# Patient Record
Sex: MALE | Race: WHITE | Employment: OTHER | URBAN - METROPOLITAN AREA
[De-identification: names, ages, dates, MRNs, and addresses within clinical notes are randomized per-mention and may not be internally consistent; named-entity substitution may affect disease eponyms.]

---

## 2017-08-20 ENCOUNTER — HOSPITAL ENCOUNTER (EMERGENCY)
Age: 47
Discharge: HOME | End: 2017-08-20
Attending: EMERGENCY MEDICINE
Payer: COMMERCIAL

## 2017-08-20 ENCOUNTER — APPOINTMENT (OUTPATIENT)
Dept: CT IMAGING | Age: 47
End: 2017-08-20
Attending: EMERGENCY MEDICINE
Payer: COMMERCIAL

## 2017-08-20 VITALS
HEART RATE: 84 BPM | SYSTOLIC BLOOD PRESSURE: 127 MMHG | DIASTOLIC BLOOD PRESSURE: 102 MMHG | OXYGEN SATURATION: 94 % | RESPIRATION RATE: 16 BRPM | TEMPERATURE: 98.2 F

## 2017-08-20 DIAGNOSIS — R56.9 SEIZURE (HCC): Primary | ICD-10-CM

## 2017-08-20 LAB
ALBUMIN SERPL-MCNC: 3.5 G/DL (ref 3.5–5)
ALBUMIN/GLOB SERPL: 1.1 {RATIO} (ref 1.1–2.2)
ALP SERPL-CCNC: 84 U/L (ref 45–117)
ALT SERPL-CCNC: 29 U/L (ref 12–78)
AMPHET UR QL SCN: NEGATIVE
ANION GAP SERPL CALC-SCNC: 9 MMOL/L (ref 5–15)
AST SERPL-CCNC: 27 U/L (ref 15–37)
BARBITURATES UR QL SCN: NEGATIVE
BASOPHILS # BLD: 0 K/UL (ref 0–0.1)
BASOPHILS NFR BLD: 0 % (ref 0–1)
BENZODIAZ UR QL: NEGATIVE
BILIRUB SERPL-MCNC: 0.2 MG/DL (ref 0.2–1)
BUN SERPL-MCNC: 10 MG/DL (ref 6–20)
BUN/CREAT SERPL: 12 (ref 12–20)
CALCIUM SERPL-MCNC: 8 MG/DL (ref 8.5–10.1)
CANNABINOIDS UR QL SCN: NEGATIVE
CHLORIDE SERPL-SCNC: 106 MMOL/L (ref 97–108)
CO2 SERPL-SCNC: 24 MMOL/L (ref 21–32)
COCAINE UR QL SCN: NEGATIVE
CREAT SERPL-MCNC: 0.84 MG/DL (ref 0.7–1.3)
DRUG SCRN COMMENT,DRGCM: NORMAL
EOSINOPHIL # BLD: 0.2 K/UL (ref 0–0.4)
EOSINOPHIL NFR BLD: 2 % (ref 0–7)
ERYTHROCYTE [DISTWIDTH] IN BLOOD BY AUTOMATED COUNT: 13.3 % (ref 11.5–14.5)
GLOBULIN SER CALC-MCNC: 3.3 G/DL (ref 2–4)
GLUCOSE SERPL-MCNC: 88 MG/DL (ref 65–100)
HCT VFR BLD AUTO: 41.4 % (ref 36.6–50.3)
HGB BLD-MCNC: 14.2 G/DL (ref 12.1–17)
LYMPHOCYTES # BLD: 4.2 K/UL (ref 0.8–3.5)
LYMPHOCYTES NFR BLD: 44 % (ref 12–49)
MCH RBC QN AUTO: 32.2 PG (ref 26–34)
MCHC RBC AUTO-ENTMCNC: 34.3 G/DL (ref 30–36.5)
MCV RBC AUTO: 93.9 FL (ref 80–99)
METHADONE UR QL: NEGATIVE
MONOCYTES # BLD: 0.9 K/UL (ref 0–1)
MONOCYTES NFR BLD: 10 % (ref 5–13)
NEUTS SEG # BLD: 4.1 K/UL (ref 1.8–8)
NEUTS SEG NFR BLD: 44 % (ref 32–75)
OPIATES UR QL: NEGATIVE
PCP UR QL: NEGATIVE
PLATELET # BLD AUTO: 331 K/UL (ref 150–400)
POTASSIUM SERPL-SCNC: 3.4 MMOL/L (ref 3.5–5.1)
PROT SERPL-MCNC: 6.8 G/DL (ref 6.4–8.2)
RBC # BLD AUTO: 4.41 M/UL (ref 4.1–5.7)
SODIUM SERPL-SCNC: 139 MMOL/L (ref 136–145)
WBC # BLD AUTO: 9.4 K/UL (ref 4.1–11.1)

## 2017-08-20 PROCEDURE — 36415 COLL VENOUS BLD VENIPUNCTURE: CPT | Performed by: EMERGENCY MEDICINE

## 2017-08-20 PROCEDURE — 96375 TX/PRO/DX INJ NEW DRUG ADDON: CPT

## 2017-08-20 PROCEDURE — 96374 THER/PROPH/DIAG INJ IV PUSH: CPT

## 2017-08-20 PROCEDURE — 80307 DRUG TEST PRSMV CHEM ANLYZR: CPT | Performed by: EMERGENCY MEDICINE

## 2017-08-20 PROCEDURE — 85025 COMPLETE CBC W/AUTO DIFF WBC: CPT | Performed by: EMERGENCY MEDICINE

## 2017-08-20 PROCEDURE — 70450 CT HEAD/BRAIN W/O DYE: CPT

## 2017-08-20 PROCEDURE — 74011250636 HC RX REV CODE- 250/636: Performed by: EMERGENCY MEDICINE

## 2017-08-20 PROCEDURE — 99285 EMERGENCY DEPT VISIT HI MDM: CPT

## 2017-08-20 PROCEDURE — 80053 COMPREHEN METABOLIC PANEL: CPT | Performed by: EMERGENCY MEDICINE

## 2017-08-20 PROCEDURE — 74011000258 HC RX REV CODE- 258: Performed by: EMERGENCY MEDICINE

## 2017-08-20 PROCEDURE — 96361 HYDRATE IV INFUSION ADD-ON: CPT

## 2017-08-20 PROCEDURE — 72125 CT NECK SPINE W/O DYE: CPT

## 2017-08-20 RX ORDER — LORAZEPAM 2 MG/ML
1 INJECTION INTRAMUSCULAR
Status: COMPLETED | OUTPATIENT
Start: 2017-08-20 | End: 2017-08-20

## 2017-08-20 RX ORDER — LEVETIRACETAM 750 MG/1
750 TABLET ORAL 2 TIMES DAILY
Qty: 30 TAB | Refills: 0 | Status: SHIPPED | OUTPATIENT
Start: 2017-08-20

## 2017-08-20 RX ADMIN — LORAZEPAM 1 MG: 2 INJECTION INTRAMUSCULAR; INTRAVENOUS at 03:10

## 2017-08-20 RX ADMIN — LEVETIRACETAM 1500 MG: 100 INJECTION, SOLUTION, CONCENTRATE INTRAVENOUS at 05:12

## 2017-08-20 RX ADMIN — SODIUM CHLORIDE 1000 ML: 9 INJECTION, SOLUTION INTRAVENOUS at 03:10

## 2017-08-20 NOTE — ED NOTES
Pt received to room via EMS. A&O x4. Wife at the bedside with pt. Reporting that pt had had a seizure. Is believed to be due to an increased intake of alcohol this evening. Reporting 9/10 sharp headache and 6/10 back pain. Dr. Mayito Odonnell at the bedside to evaluate pt. Seizure precautions initiated.
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
## 2017-08-20 NOTE — ED NOTES
Pt sleeping in stretcher. Call bell within reach. Updated pt's wife on plan of care. Awaiting imaging results. No other complaints voiced at this time.

## 2017-08-20 NOTE — ED NOTES
Pt sitting up at the end of his bed. Has pulled off his pulse ox and was attempting pull out IV. This nurse to the bedside to, pt reporting needs to use restroom. Upon standing up, pt was unsteady. Insisted to continue to walk, despite swaying back and forth. \"I just got up too fast.\"     This nurse to assist pt with unsteady gait to restroom. Second nurse to assist primary nurse after pt ambulated first 10-15 feet. Pt refused to use restroom while in presence of nurse. Advised pt to pull on red chord in bathroom prior to getting up. Pt then stood up and opened door, alarm to being finished was not pulled. This nurse to assist pt back, again with unsteady gait. \"I'm fine, I can walk by myself. \" This nurse had to redirect due to stumbling. Dr. Tony Roper advised of ambulating experience.

## 2017-08-20 NOTE — ED NOTES
Bedside shift change report given to Krystle Omer RN (oncoming nurse) by Nelida Acuña RN (offgoing nurse). Report included the following information ED Summary.

## 2017-08-20 NOTE — ED NOTES
Pt sleeping in stretcher. Call bell within reach. Medicated with seizure medication. Family updated on plan of care. No other complaints voiced at this time.

## 2017-08-20 NOTE — ED NOTES
Pt. Noah Barker with gait belt. Pt. Was slow. Pt. Lpuis Welch in wheelchair and taken to vehicle by RN.

## 2019-09-04 ENCOUNTER — HOSPITAL ENCOUNTER (EMERGENCY)
Facility: HOSPITAL | Age: 49
Discharge: HOME | End: 2019-09-04
Admitting: EMERGENCY MEDICINE

## 2019-09-04 ENCOUNTER — APPOINTMENT (OUTPATIENT)
Dept: GENERAL RADIOLOGY | Facility: HOSPITAL | Age: 49
End: 2019-09-04

## 2019-09-04 VITALS
WEIGHT: 190 LBS | HEIGHT: 75 IN | OXYGEN SATURATION: 100 % | HEART RATE: 84 BPM | TEMPERATURE: 98.4 F | SYSTOLIC BLOOD PRESSURE: 149 MMHG | RESPIRATION RATE: 16 BRPM | BODY MASS INDEX: 23.62 KG/M2 | DIASTOLIC BLOOD PRESSURE: 72 MMHG

## 2019-09-04 DIAGNOSIS — S61.217A LACERATION OF LEFT LITTLE FINGER WITHOUT FOREIGN BODY WITHOUT DAMAGE TO NAIL, INITIAL ENCOUNTER: Primary | ICD-10-CM

## 2019-09-04 PROCEDURE — 73130 X-RAY EXAM OF HAND: CPT

## 2019-09-04 PROCEDURE — 99282 EMERGENCY DEPT VISIT SF MDM: CPT

## 2019-09-05 NOTE — ED PROVIDER NOTES
Subjective   Patient is a 49-year-old white male with no significant medical history who presents today with complaints of a laceration to his left fifth finger.  States he was cutting something at work today when the knife slipped and cut his left hand.  Denies any numbness tingling or weakness distal to the injury.  He denies any other injury or complaint.  States his last tetanus booster was 3 years ago.            Review of Systems   Skin: Positive for wound.        Left hand laceration   Neurological: Negative for weakness and numbness.       No past medical history on file.    Allergies   Allergen Reactions   • Morphine Unknown (See Comments)     unknown       No past surgical history on file.    No family history on file.    Social History     Socioeconomic History   • Marital status:      Spouse name: Not on file   • Number of children: Not on file   • Years of education: Not on file   • Highest education level: Not on file           Objective   Physical Exam   Constitutional: He appears well-developed.   Vital signs and triage nurse note reviewed.  Constitutional: Awake, alert; well-developed and well-nourished. No acute distress is noted.  Cardiovascular: Regular rate and rhythm  Pulmonary: Respiratory effort regular nonlabored  Musculoskeletal: Independent range of motion of all extremities with no palpable tenderness or edema.  Neuro: Alert oriented x3, speech is clear and appropriate, GCS 15.    Skin: Flesh tone, warm, dry; no erythematous or petechial rash or lesion.  There is a 2.5 cm laceration noted over the palmar aspect of the proximal left fifth finger.  Small amount of active bleeding controlled with direct pressure.  No visible tendon laceration.  Good strength with flexion and extension against resistance.  Good cap refill and sensation distally.        Laceration Repair  Date/Time: 9/5/2019 1:32 AM  Performed by: Ayesha Corbett APRN  Authorized by: Ayesha Corbett APRN     Consent:      Consent obtained:  Verbal    Consent given by:  Patient    Risks discussed:  Infection, pain, tendon damage, vascular damage, nerve damage and need for additional repair  Anesthesia (see MAR for exact dosages):     Anesthesia method:  Local infiltration    Local anesthetic:  Lidocaine 2% w/o epi  Laceration details:     Location:  Finger    Finger location:  L small finger    Length (cm):  2.5  Repair type:     Repair type:  Simple  Pre-procedure details:     Preparation:  Patient was prepped and draped in usual sterile fashion  Exploration:     Wound exploration: wound explored through full range of motion and entire depth of wound probed and visualized      Wound extent: no tendon damage noted    Treatment:     Area cleansed with:  Hibiclens and saline    Amount of cleaning:  Standard  Skin repair:     Repair method:  Sutures    Suture size:  4-0    Suture material:  Nylon    Suture technique:  Simple interrupted    Number of sutures:  8  Approximation:     Approximation:  Close    Vermilion border: well-aligned    Post-procedure details:     Dressing:  Antibiotic ointment and non-adherent dressing    Patient tolerance of procedure:  Tolerated well, no immediate complications               ED Course  Final Diagnosis: as of Sep 05 0134   Laceration of left little finger without foreign body without damage to nail, initial encounter      Labs Reviewed - No data to display  Xr Hand 3+ View Right    Result Date: 9/4/2019  No acute fracture or dislocation.  Electronically Signed By-Pierre Mora On:9/4/2019 7:32 PM This report was finalized on [id number] by  Pierre Mora, .    Medications - No data to display              MDM  Number of Diagnoses or Management Options  Laceration of left little finger without foreign body without damage to nail, initial encounter:      Amount and/or Complexity of Data Reviewed  Tests in the radiology section of CPT®: reviewed and ordered    Risk of Complications, Morbidity, and/or  Mortality  General comments: Comorbidities: None  Differentials: Fracture, skin laceration, tendon laceration;this list is not all inclusive and does not constitute the entirety of considered causes    Patient had x-rays obtained.  He had laceration repaired as noted above.  He states he is up-to-date on tetanus booster.    Diagnosis and treatment plan discussed with patient.  Patient agreeable to plan.   I discussed findings with patient who voices understanding of discharge instructions, signs and symptoms requiring return to ED; discharged improved and in stable condition with follow up for re-evaluation.        Patient Progress  Patient progress: stable      Final diagnoses:   Laceration of left little finger without foreign body without damage to nail, initial encounter          Ayesha Corbett, SHANNAN  09/05/19 0134

## 2019-09-05 NOTE — DISCHARGE INSTRUCTIONS
Keep the wound clean with soap and water daily. Apply antibiotic ointment daily. Sutures/staples to be removed in 10-12 days, schedule an appointment with your PCP to have this done, if you do not have a PCP any urgent care or immediate care type places can remove sutures/staples.  Watch for signs of infection.  Return for new or worsening symptoms.

## 2020-01-10 ENCOUNTER — APPOINTMENT (OUTPATIENT)
Dept: GENERAL RADIOLOGY | Facility: HOSPITAL | Age: 50
End: 2020-01-10

## 2020-01-10 ENCOUNTER — HOSPITAL ENCOUNTER (EMERGENCY)
Facility: HOSPITAL | Age: 50
Discharge: HOME | End: 2020-01-10
Admitting: EMERGENCY MEDICINE

## 2020-01-10 VITALS
SYSTOLIC BLOOD PRESSURE: 136 MMHG | TEMPERATURE: 97.6 F | DIASTOLIC BLOOD PRESSURE: 83 MMHG | RESPIRATION RATE: 16 BRPM | OXYGEN SATURATION: 97 % | HEIGHT: 75 IN | WEIGHT: 190.04 LBS | BODY MASS INDEX: 23.63 KG/M2 | HEART RATE: 77 BPM

## 2020-01-10 DIAGNOSIS — M25.552 LEFT HIP PAIN: ICD-10-CM

## 2020-01-10 DIAGNOSIS — M54.42 BILATERAL LOW BACK PAIN WITH LEFT-SIDED SCIATICA, UNSPECIFIED CHRONICITY: Primary | ICD-10-CM

## 2020-01-10 PROCEDURE — 25010000002 DEXAMETHASONE SODIUM PHOSPHATE 10 MG/ML SOLUTION: Performed by: PHYSICIAN ASSISTANT

## 2020-01-10 PROCEDURE — 99283 EMERGENCY DEPT VISIT LOW MDM: CPT

## 2020-01-10 PROCEDURE — 96374 THER/PROPH/DIAG INJ IV PUSH: CPT

## 2020-01-10 PROCEDURE — 96375 TX/PRO/DX INJ NEW DRUG ADDON: CPT

## 2020-01-10 PROCEDURE — 73502 X-RAY EXAM HIP UNI 2-3 VIEWS: CPT

## 2020-01-10 PROCEDURE — 72110 X-RAY EXAM L-2 SPINE 4/>VWS: CPT

## 2020-01-10 PROCEDURE — 25010000002 KETOROLAC TROMETHAMINE PER 15 MG: Performed by: PHYSICIAN ASSISTANT

## 2020-01-10 RX ORDER — LIDOCAINE 50 MG/G
1 PATCH TOPICAL EVERY 24 HOURS
Qty: 6 PATCH | Refills: 0 | Status: SHIPPED | OUTPATIENT
Start: 2020-01-10

## 2020-01-10 RX ORDER — DEXAMETHASONE SODIUM PHOSPHATE 10 MG/ML
10 INJECTION, SOLUTION INTRAMUSCULAR; INTRAVENOUS ONCE
Status: COMPLETED | OUTPATIENT
Start: 2020-01-10 | End: 2020-01-10

## 2020-01-10 RX ORDER — METHOCARBAMOL 750 MG/1
750 TABLET, FILM COATED ORAL 3 TIMES DAILY
Qty: 12 TABLET | Refills: 0 | Status: SHIPPED | OUTPATIENT
Start: 2020-01-10 | End: 2020-03-18

## 2020-01-10 RX ORDER — KETOROLAC TROMETHAMINE 15 MG/ML
15 INJECTION, SOLUTION INTRAMUSCULAR; INTRAVENOUS ONCE
Status: COMPLETED | OUTPATIENT
Start: 2020-01-10 | End: 2020-01-10

## 2020-01-10 RX ORDER — METHYLPREDNISOLONE 4 MG/1
TABLET ORAL
Qty: 21 TABLET | Refills: 0 | Status: SHIPPED | OUTPATIENT
Start: 2020-01-10

## 2020-01-10 RX ORDER — METHOCARBAMOL 750 MG/1
750 TABLET, FILM COATED ORAL ONCE
Status: COMPLETED | OUTPATIENT
Start: 2020-01-10 | End: 2020-01-10

## 2020-01-10 RX ORDER — SODIUM CHLORIDE 0.9 % (FLUSH) 0.9 %
10 SYRINGE (ML) INJECTION AS NEEDED
Status: DISCONTINUED | OUTPATIENT
Start: 2020-01-10 | End: 2020-01-10 | Stop reason: HOSPADM

## 2020-01-10 RX ORDER — LIDOCAINE 50 MG/G
1 PATCH TOPICAL ONCE
Status: DISCONTINUED | OUTPATIENT
Start: 2020-01-10 | End: 2020-01-10 | Stop reason: HOSPADM

## 2020-01-10 RX ADMIN — METHOCARBAMOL 750 MG: 750 TABLET ORAL at 11:27

## 2020-01-10 RX ADMIN — KETOROLAC TROMETHAMINE 15 MG: 15 INJECTION, SOLUTION INTRAMUSCULAR; INTRAVENOUS at 11:27

## 2020-01-10 RX ADMIN — DEXAMETHASONE SODIUM PHOSPHATE 10 MG: 10 INJECTION, SOLUTION INTRAMUSCULAR; INTRAVENOUS at 11:27

## 2020-01-10 RX ADMIN — LIDOCAINE 1 PATCH: 50 PATCH CUTANEOUS at 11:27

## 2020-01-10 NOTE — DISCHARGE INSTRUCTIONS
Take Medrol Dosepak to completion.  Take Robaxin as needed for muscle spasms and pain.  Do not drive while taking this medication.  Use Lidoderm patch on lower back as needed for pain.  May apply heating pad to lower back in 20-minute increments every 2-3 hours while awake.  Use ibuprofen or Tylenol as needed for back pain.  Do not mix ibuprofen Advil, Aleve, Motrin, diclofenac or naproxen.  Try to avoid narcotic pain medication.    Follow-up with primary care, call Dr. Casey's office to establish care.  May call Dr. Lezama's office for further management evaluation of lumbar spine and chronic degenerative spine.    Return to the ER for new or worsening symptoms, increased lower back pain, spasms, radiating pain down lower extremities, numbness and tingling, saddle anesthesia, bladder or bowel dysfunction, chest pain, shortness of breath headache or fever.

## 2020-01-10 NOTE — ED PROVIDER NOTES
"Subjective   Patient is a 49-year-old  male with history of chronic lower back pain and sciatica pain presents ER complaining of lower back pain and left hip pain for 2 to 3 days.  Patient reports a long history of back pain due to \"slipped disc, sciatica pain, herniated disc, and arthritis\", states that he has seen multiple doctors in [place] where he used to live and had steroid injections for his pain.  Patient states that he moved back to [place] sometime ago but has not followed up with any specialist or spinal surgeons.  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  Patient denies any recent fall, trauma or injury.  Patient reports about 3 days ago he started having lower back pain that radiated to his left hip and down his left leg.  Patient describes the pain as a constant dull achy pain with occasional sharp pain.  Patient also complains of some numbness and burning sensations in his left leg.  Patient denies any weakness.  Denies his leg giving out on him, but states that he has had to walk with a cane to keep weight off his left leg and to help with pain.  Patient reports at rest his pain is 6/10, states that with range of motion his pain is a 10/10.  Patient states that he has tried ibuprofen, heat, ice, elevation of his leg at home with no relief.  Patient denies any saddle anesthesia, denies pain in his right leg, denies any bladder or bowel dysfunction.      History provided by:  Patient      Review of Systems   Constitutional: Negative for fever.   HENT: Negative for sore throat and trouble swallowing.    Respiratory: Negative for shortness of breath and wheezing.    Cardiovascular: Negative for chest pain.   Gastrointestinal: Negative for abdominal pain.   Genitourinary: Negative for dysuria.   Musculoskeletal: Positive for arthralgias and back pain. Negative for joint swelling.        Lower back pain, left hip pain   Skin: Negative for " rash.   Neurological: Positive for numbness. Negative for headaches.   Psychiatric/Behavioral: Negative for behavioral problems.   All other systems reviewed and are negative.      No past medical history on file.    Allergies   Allergen Reactions   • Morphine Unknown (See Comments)     unknown       No past surgical history on file.    No family history on file.    Social History     Socioeconomic History   • Marital status:      Spouse name: Not on file   • Number of children: Not on file   • Years of education: Not on file   • Highest education level: Not on file           Objective   Physical Exam   Constitutional: He is oriented to person, place, and time. He appears well-developed and well-nourished. No distress.   HENT:   Head: Normocephalic and atraumatic.   Eyes: Pupils are equal, round, and reactive to light. EOM are normal.   Cardiovascular: Normal rate, regular rhythm, normal heart sounds and intact distal pulses.   No murmur heard.  Pulmonary/Chest: Effort normal and breath sounds normal. He has no wheezes.   Musculoskeletal: He exhibits tenderness. He exhibits no deformity.   Lumbar spine: No step-offs or deformities, no midline tenderness to palpation.  Tenderness to palpation of lateral left hip and lateral left leg  Bilateral lower extremities: Positive left straight leg raise.  5 out of 5 strength.  Sensation intact to light touch.     Neurological: He is alert and oriented to person, place, and time. No sensory deficit. He exhibits normal muscle tone.   Skin: Skin is warm. Capillary refill takes less than 2 seconds. No rash noted.   No overlying erythema, edema or warmth.  No overlying abrasions, lacerations or rash, no signs of cellulitis or infection   Psychiatric: He has a normal mood and affect. His behavior is normal. Judgment and thought content normal.   Nursing note and vitals reviewed.      Procedures           ED Course    /83 (BP Location: Left arm, Patient Position:  "Sitting)   Pulse 77   Temp 97.6 °F (36.4 °C) (Oral)   Resp 16   Ht 190.5 cm (75\")   Wt 86.2 kg (190 lb 0.6 oz)   SpO2 97%   BMI 23.75 kg/m²   Labs Reviewed - No data to display  Medications   sodium chloride 0.9 % flush 10 mL (has no administration in time range)   lidocaine (LIDODERM) 5 % 1 patch (1 patch Transdermal Medication Applied 1/10/20 1127)   ketorolac (TORADOL) injection 15 mg (15 mg Intravenous Given 1/10/20 1127)   methocarbamol (ROBAXIN) tablet 750 mg (750 mg Oral Given 1/10/20 1127)   dexamethasone sodium phosphate injection 10 mg (10 mg Intravenous Given 1/10/20 1127)     Xr Spine Lumbar Complete 4+vw    Result Date: 1/10/2020  No evidence of acute fracture or subluxation. Mild, grade 1 retrolisthesis of L2 on L3 and L5 on S1.  Electronically Signed By-Evans Fournier On:1/10/2020 12:12 PM This report was finalized on [id number] by  Evans Fournier, .    Xr Hip With Or Without Pelvis 2 - 3 View Left    Result Date: 1/10/2020  1. No acute osseous abnormality. 2. Mild osseous prominence of the femoral head neck junction which can be seen with femoral acetabular impingement. No significant joint space narrowing or osteophyte formation.  Electronically Signed By-Jayme Mancia On:1/10/2020 12:11 PM This report was finalized on [id number] by  Jayme Mancia, .                                               MDM  Number of Diagnoses or Management Options  Bilateral low back pain with left-sided sciatica, unspecified chronicity:   Left hip pain:   Diagnosis management comments: MEDICAL DECISION  Epic Chart Review:  Comorbidities: arthritis, DDD  Differentials:  Fracture, contusion, sprain, strain, sciatica, nerve impingement, disc herniation ; this list is not all inclusive and does not constitute the entirety of considered causes  Radiology interpretation:  Images reviewed by me and interpreted by radiologist,   XR Hip  Final result by Jayme Mancia MD (01/10/20 " 12:11:30)             Impression:     1. No acute osseous abnormality.  2. Mild osseous prominence of the femoral head neck junction which can  be seen with femoral acetabular impingement. No significant joint space  narrowing or osteophyte formation.     Electronically Signed By-Jayme Mancia On:1/10/2020 12:11 PM  This report was finalized on [id number] by  Jayme Mancia, .        Narrative:     XR HIP W OR WO PELVIS 2-3 VIEW LEFT-     Date of Exam: 1/10/2020 11:37 AM     Indication: Left-sided hip pain     Comparison: Lumbar spine radiographs dated 01/10/2020     Technique: 3 views of the left hip were obtained.     FINDINGS:    There is no acute fracture or dislocation. The SI joints and pubic  symphysis are normally aligned. The ilioischial and iliopectineal lines  are intact. There is no significant joint space narrowing or osteophyte  formation. There is mild osseous prominence of the femoral head neck  junction which can be associated with femoral acetabular impingement.  Please see dedicated lumbar spine report for full details.    XR Lumbar Spine  Final result by Evans Fournier MD (01/10/20 12:12:54)             Impression:     No evidence of acute fracture or subluxation. Mild, grade 1  retrolisthesis of L2 on L3 and L5 on S1.     Electronically Signed By-Evans Fournier On:1/10/2020 12:12 PM  This report was finalized on [id number] by  Evans Fournier, .        Narrative:     DATE OF EXAM:  1/10/2020 11:38 AM     PROCEDURE:  XR SPINE LUMBAR COMPLETE 4+VW-     INDICATIONS:  lbp left-sided low back pain, radiculopathy to left leg and buttock.     COMPARISON:  No Comparisons Available     TECHNIQUE:   A complete lumbar spine with a minimum of four radiologic views were  obtained.           FINDINGS:  There is mild to moderate degenerative change of facet arthrosis, worse  in the lower lumbar levels. There is no evidence of acute fracture or  subluxation. There is mild retrolisthesis of L2 on L3 of  approximately 3  mm and of L5 on S1 of approximately 4 mm. Atherosclerotic vascular  calcification is incidentally noted.     Patient was seen and evaluated by myself here in the ER. Patient had peripheral IV placed, was given Toradol 15 mg, Decadron 10 mg, Robaxin 750 mg and Lidoderm patch. XR of lumbar spine showed no evidence of acute fracture or subluxation.  No acute osseous abnormalities.  Mild osseous prominence of the femoral head neck junction, no significant joint space narrowing or osteophyte formation.  Patient reported improvement in his pain with medications.  Patient be discharged with prescription for Medrol Dosepak, Robaxin and Lidoderm patches.  Recommended ibuprofen or Tylenol for pain, recommended avoidance of narcotic pain medication.    Patient was given contact information for [facility] as well as Dr. Casey's office to establish primary care.  Patient also given contact information for Dr. Lezama, as he has not seen a spinal surgeon since [place] the [place] area.    Discharge plan and instructions were discussed with the patient who verbalized understanding and is in agreement with the plan, all questions were answered at this time.  Patient is aware of signs symptoms that would require immediate return to the emergency room.  Patient understands importance of following up with primary care provider for further evaluation and worsening concerns as well as blood pressure recheck in the next 4 weeks.    Patient remained afebrile, nontoxic-appearing, no acute respiratory distress throughout entire emergency room stay.  Patient was discharged in improved stable condition, ambulated independently with cane out of ER.         Amount and/or Complexity of Data Reviewed  Tests in the radiology section of CPT®: reviewed and ordered    Patient Progress  Patient progress: improved      Final diagnoses:   Bilateral low back pain with left-sided sciatica, unspecified chronicity    Left hip pain            Tereza Romero PA  01/10/20 5734

## 2020-03-18 ENCOUNTER — APPOINTMENT (OUTPATIENT)
Dept: GENERAL RADIOLOGY | Facility: HOSPITAL | Age: 50
End: 2020-03-18

## 2020-03-18 ENCOUNTER — HOSPITAL ENCOUNTER (EMERGENCY)
Facility: HOSPITAL | Age: 50
Discharge: HOME | End: 2020-03-18
Admitting: EMERGENCY MEDICINE

## 2020-03-18 VITALS
TEMPERATURE: 98 F | BODY MASS INDEX: 24.37 KG/M2 | HEIGHT: 75 IN | DIASTOLIC BLOOD PRESSURE: 98 MMHG | HEART RATE: 75 BPM | OXYGEN SATURATION: 100 % | SYSTOLIC BLOOD PRESSURE: 152 MMHG | RESPIRATION RATE: 18 BRPM | WEIGHT: 196 LBS

## 2020-03-18 DIAGNOSIS — G89.11 ACUTE TRAUMATIC PAIN: ICD-10-CM

## 2020-03-18 DIAGNOSIS — S70.02XA CONTUSION OF LEFT HIP, INITIAL ENCOUNTER: ICD-10-CM

## 2020-03-18 DIAGNOSIS — S60.222A CONTUSION OF LEFT HAND, INITIAL ENCOUNTER: ICD-10-CM
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
## 2020-03-18 PROCEDURE — 73502 X-RAY EXAM HIP UNI 2-3 VIEWS: CPT

## 2020-03-18 PROCEDURE — 73130 X-RAY EXAM OF HAND: CPT

## 2020-03-18 PROCEDURE — 99282 EMERGENCY DEPT VISIT SF MDM: CPT

## 2020-03-18 RX ORDER — IBUPROFEN 600 MG/1
600 TABLET ORAL EVERY 8 HOURS PRN
Qty: 15 TABLET | Refills: 0 | Status: SHIPPED | OUTPATIENT
Start: 2020-03-18 | End: 2021-04-14 | Stop reason: SDUPTHER

## 2020-03-18 RX ORDER — TIZANIDINE HYDROCHLORIDE 2 MG/1
2 CAPSULE, GELATIN COATED ORAL 3 TIMES DAILY
Qty: 10 CAPSULE | Refills: 0 | Status: SHIPPED | OUTPATIENT
Start: 2020-03-18

## 2020-08-13 ENCOUNTER — HOSPITAL ENCOUNTER (EMERGENCY)
Facility: HOSPITAL | Age: 50
Discharge: HOME | End: 2020-08-13
Attending: EMERGENCY MEDICINE | Admitting: EMERGENCY MEDICINE

## 2020-08-13 VITALS
HEART RATE: 91 BPM | SYSTOLIC BLOOD PRESSURE: 132 MMHG | RESPIRATION RATE: 18 BRPM | BODY MASS INDEX: 24.31 KG/M2 | HEIGHT: 75 IN | TEMPERATURE: 98.4 F | OXYGEN SATURATION: 99 % | WEIGHT: 195.55 LBS | DIASTOLIC BLOOD PRESSURE: 78 MMHG

## 2020-08-13 DIAGNOSIS — K64.5 THROMBOSED EXTERNAL HEMORRHOID: Primary | ICD-10-CM

## 2020-08-13 PROCEDURE — 99283 EMERGENCY DEPT VISIT LOW MDM: CPT

## 2020-08-13 NOTE — ED NOTES
Has had hemorrhoid issues since his early 20's.  Has tried warm baths and preparation H with no relief.       Fely Jett RN  08/13/20 6092

## 2020-08-13 NOTE — ED PROVIDER NOTES
Subjective   Patient is a 50-year-old male complaining of a painful hemorrhoid that he is had for the past several days.  He states he has had many thrombosed hemorrhoids in the past.  He denies fever or other complaint          Review of Systems  Negative for fever of arm diarrhea dysuria or other complaint  No past medical history on file.    Allergies   Allergen Reactions   • Morphine Unknown - High Severity     Unknown, pt mother told him he had a bad reaction as a child        No past surgical history on file.    No family history on file.    Social History     Socioeconomic History   • Marital status:      Spouse name: Not on file   • Number of children: Not on file   • Years of education: Not on file   • Highest education level: Not on file           Objective   Physical Exam  Rectal exam shows patient has a thrombosed external hemorrhoid.  Procedures     Patient had the hemorrhoid anesthetized with 1% lidocaine with epinephrine.  A stab incision was performed with #11 blade with release of the clot.      ED Course                                           MDM  Number of Diagnoses or Management Options  Diagnosis management comments: Patient had a thrombosed hemorrhoid which was excised and removed without difficulty.  Patient will be discharged to follow-up as MD for any problems    Risk of Complications, Morbidity, and/or Mortality  Presenting problems: moderate  Diagnostic procedures: moderate  Management options: moderate    Patient Progress  Patient progress: stable      Final diagnoses:   Thrombosed external hemorrhoid            Evans Henry MD  08/13/20 3901

## 2021-01-29 ENCOUNTER — APPOINTMENT (OUTPATIENT)
Dept: GENERAL RADIOLOGY | Facility: HOSPITAL | Age: 51
End: 2021-01-29

## 2021-01-29 ENCOUNTER — APPOINTMENT (OUTPATIENT)
Dept: MRI IMAGING | Facility: HOSPITAL | Age: 51
End: 2021-01-29

## 2021-01-29 ENCOUNTER — HOSPITAL ENCOUNTER (EMERGENCY)
Facility: HOSPITAL | Age: 51
Discharge: HOME | End: 2021-01-29
Attending: EMERGENCY MEDICINE | Admitting: EMERGENCY MEDICINE

## 2021-01-29 VITALS
DIASTOLIC BLOOD PRESSURE: 88 MMHG | BODY MASS INDEX: 23.52 KG/M2 | WEIGHT: 189.15 LBS | RESPIRATION RATE: 16 BRPM | SYSTOLIC BLOOD PRESSURE: 142 MMHG | HEART RATE: 66 BPM | OXYGEN SATURATION: 99 % | TEMPERATURE: 98.2 F | HEIGHT: 75 IN

## 2021-01-29 DIAGNOSIS — M54.42 ACUTE BILATERAL LOW BACK PAIN WITH LEFT-SIDED SCIATICA: ICD-10-CM

## 2021-01-29 DIAGNOSIS — M51.36 LUMBAR DEGENERATIVE DISC DISEASE: Primary | ICD-10-CM

## 2021-01-29 PROCEDURE — 96372 THER/PROPH/DIAG INJ SC/IM: CPT

## 2021-01-29 PROCEDURE — 70210 X-RAY EXAM OF SINUSES: CPT

## 2021-01-29 PROCEDURE — 72148 MRI LUMBAR SPINE W/O DYE: CPT

## 2021-01-29 PROCEDURE — 25010000002 METHYLPREDNISOLONE PER 80 MG: Performed by: EMERGENCY MEDICINE

## 2021-01-29 PROCEDURE — 99283 EMERGENCY DEPT VISIT LOW MDM: CPT

## 2021-01-29 RX ORDER — HYDROCODONE BITARTRATE AND ACETAMINOPHEN 5; 325 MG/1; MG/1
1 TABLET ORAL EVERY 6 HOURS PRN
Qty: 12 TABLET | Refills: 0 | Status: SHIPPED | OUTPATIENT
Start: 2021-01-29 | End: 2021-04-27

## 2021-01-29 RX ORDER — HYDROCODONE BITARTRATE AND ACETAMINOPHEN 5; 325 MG/1; MG/1
1 TABLET ORAL ONCE
Status: COMPLETED | OUTPATIENT
Start: 2021-01-29 | End: 2021-01-29

## 2021-01-29 RX ORDER — METHYLPREDNISOLONE ACETATE 80 MG/ML
80 INJECTION, SUSPENSION INTRA-ARTICULAR; INTRALESIONAL; INTRAMUSCULAR; SOFT TISSUE ONCE
Status: COMPLETED | OUTPATIENT
Start: 2021-01-29 | End: 2021-01-29

## 2021-01-29 RX ORDER — METAXALONE 800 MG/1
800 TABLET ORAL 3 TIMES DAILY PRN
Qty: 10 TABLET | Refills: 0 | Status: SHIPPED | OUTPATIENT
Start: 2021-01-29

## 2021-01-29 RX ADMIN — METHYLPREDNISOLONE ACETATE 80 MG: 80 INJECTION, SUSPENSION INTRA-ARTICULAR; INTRALESIONAL; INTRAMUSCULAR; SOFT TISSUE at 17:32

## 2021-01-29 RX ADMIN — HYDROCODONE BITARTRATE AND ACETAMINOPHEN 1 TABLET: 5; 325 TABLET ORAL at 17:28

## 2021-04-14 ENCOUNTER — OFFICE VISIT (OUTPATIENT)
Dept: PAIN MEDICINE | Facility: CLINIC | Age: 51
End: 2021-04-14

## 2021-04-14 VITALS
HEIGHT: 75 IN | DIASTOLIC BLOOD PRESSURE: 88 MMHG | SYSTOLIC BLOOD PRESSURE: 147 MMHG | BODY MASS INDEX: 23.5 KG/M2 | WEIGHT: 189 LBS | TEMPERATURE: 96.9 F | RESPIRATION RATE: 16 BRPM | HEART RATE: 91 BPM

## 2021-04-14 DIAGNOSIS — M51.26 DISPLACEMENT OF LUMBAR INTERVERTEBRAL DISC WITHOUT MYELOPATHY: Primary | ICD-10-CM

## 2021-04-14 PROCEDURE — 99204 OFFICE O/P NEW MOD 45 MIN: CPT | Performed by: STUDENT IN AN ORGANIZED HEALTH CARE EDUCATION/TRAINING PROGRAM

## 2021-04-14 RX ORDER — IBUPROFEN 200 MG
TABLET ORAL
COMMUNITY

## 2021-04-14 RX ORDER — GABAPENTIN 300 MG/1
300 CAPSULE ORAL 3 TIMES DAILY
Qty: 90 CAPSULE | Refills: 0 | Status: SHIPPED | OUTPATIENT
Start: 2021-04-14

## 2021-04-15 ENCOUNTER — TELEPHONE (OUTPATIENT)
Dept: PAIN MEDICINE | Facility: CLINIC | Age: 51
End: 2021-04-15

## 2021-04-15 NOTE — TELEPHONE ENCOUNTER
Provider: ALIS KEITA  Caller: TOO MONGE(SPOUSE- VERBAL)  Relationship to Patient: SPOUSE    Phone Number: [phone]  Reason for Call: SPOUSE (TOO- CELSO VERBAL) STATES PATIENT IN SEVER PAIN- NOT ABLE TO WALK- MEDS PRESCRIBED ON 04/14/2021 ARE NOT WORKING- NEEDS RELIEF- STATES BEEN IN PAIN FOR OVER 4 MONTHS    When was the patient last seen: 04/14/2021

## 2021-04-15 NOTE — TELEPHONE ENCOUNTER
Patient's wife called and said that husbands pain is worse that he has had to crawl to the bathroom that medication is not helping Please advise.

## 2021-04-15 NOTE — PROGRESS NOTES
CHIEF COMPLAINT  Chief Complaint   Patient presents with   • Leg Pain     lt---no narcotics--- New Patient--( tramadol  has not taken for 2 weeks)   • Back Pain   • Hip Pain     lt       Primary Care  Radha Alonso MD    Subjective   Juaquin Pena is a 51 y.o. male  who presents for chronic low back pain and radicular low back pain.  He states that he has had low back pain for many years as he always worked fairly physical job.  He states that most recently, the back pain began to get to the point where it was significantly impacting his ability to move and function.  He states that he has had at least one trip to the emergency room for the back pain.  MRI in the emergency room did show diffuse disc bulges throughout the lumbar spine.  He describes pain radiating down bilateral lower extremities, left greater than right.  The pain is worse with any type of physical activity movement, and is slightly improved with rest.  He denies any red flag symptoms such as loss of bowel or bladder    History of Present Illness     Location: Low back with radiation left greater than right lower extremity  Onset: Many years ago, recently worse  Duration: Progressively worsening  Timing: Constant throughout the day  Quality: Sharp, stabbing with numbness and tingling  Severity: Today: 7       Last Week: 7       Worst: 8  Modifying Factors: The pain is worse with any type of physical activity and movement.  The pain is slightly improved with rest    Physical Therapy: no    Interval Update 04/14/2021:     The following portions of the patient's history were reviewed and updated as appropriate: allergies, current medications, past family history, past medical history, past social history, past surgical history and problem list.      Current Outpatient Medications:   •  ibuprofen (ADVIL,MOTRIN) 200 MG tablet, , Disp: , Rfl:   •  lidocaine (LIDODERM) 5 %, Place 1 patch on the skin as directed by provider Daily. Remove & Discard  "patch within 12 hours or as directed by MD, Disp: 6 patch, Rfl: 0  •  gabapentin (NEURONTIN) 300 MG capsule, Take 1 capsule by mouth 3 (Three) Times a Day., Disp: 90 capsule, Rfl: 0  •  HYDROcodone-acetaminophen (NORCO) 5-325 MG per tablet, Take 1 tablet by mouth Every 6 (Six) Hours As Needed for Moderate Pain  or Severe Pain ., Disp: 12 tablet, Rfl: 0  •  metaxalone (SKELAXIN) 800 MG tablet, Take 1 tablet by mouth 3 (Three) Times a Day As Needed for Muscle Spasms (And pain)., Disp: 10 tablet, Rfl: 0  •  methylPREDNISolone (MEDROL, FRANSISCO,) 4 MG tablet, Take as directed on package instructions., Disp: 21 tablet, Rfl: 0  •  TiZANidine (ZANAFLEX) 2 MG capsule, Take 1 capsule by mouth 3 (Three) Times a Day., Disp: 10 capsule, Rfl: 0    Review of Systems   Constitutional: Positive for activity change.   Musculoskeletal: Positive for arthralgias, back pain and gait problem.   Neurological: Positive for weakness and numbness.       Vitals:    04/14/21 1523   BP: 147/88   Pulse: 91   Resp: 16   Temp: 96.9 °F (36.1 °C)   Weight: 85.7 kg (189 lb)   Height: 190.5 cm (75\")   PainSc:   7       Objective   Physical Exam  Vitals and nursing note reviewed.   Constitutional:       General: He is not in acute distress.     Appearance: Normal appearance. He is well-developed and normal weight.   Neck:      Trachea: No tracheal deviation.   Musculoskeletal:      Comments: Lumbar Spine Exam:  Tender to palpation over the lumbar paraspinal musculature Yes  Limited range of motion secondary to pain Yes  Facet loading positive: bilateral  Facets tender to palpation: bilateral  Straight leg raise test positive: left         Neurological:      Mental Status: He is alert.      Comments: Left lower extremity tender to palpation across the anterior aspect of the thigh with positive straight leg raise on the left.  Also, diffusely weak in the left lower extremity due to pain           Assessment/Plan   Problems Addressed this Visit     None    "   Visit Diagnoses     Displacement of lumbar intervertebral disc without myelopathy    -  Primary      Diagnoses       Codes Comments    Displacement of lumbar intervertebral disc without myelopathy    -  Primary ICD-10-CM: M51.26  ICD-9-CM: 722.10           Plan:  1. Given his MRI findings of diffuse disc bulges throughout as well as his frankly positive radicular symptoms on exam, will plan for lumbar epidural steroid injection  2. Also start gabapentin 300 mg 3 times daily.  3. Previously, he had tried both hydrocodone as well as tramadol without significant pain relief.  Given his failure of narcotic pain medication, will plan for interventional therapy.  --- Follow-up next available for lumbar epidural steroid injection           INSPECT REPORT    As part of the patient's treatment plan, I may be prescribing controlled substances. The patient has been made aware of appropriate use of such medications, including potential risk of somnolence, limited ability to drive and/or work safely, and the potential for dependence or overdose. It has also bee made clear that these medications are for use by this patient only, without concomitant use of alcohol or other substances unless prescribed.     Patient has completed prescribing agreement detailing terms of continued prescribing of controlled substances, including monitoring TERENCE reports, urine drug screening, and pill counts if necessary. The patient is aware that inappropriate use will results in cessation of prescribing such medications.    INSPECT report has been reviewed and scanned into the patient's chart.    As the clinician, I personally reviewed the INSPECT from 4/12/2021.    History and physical exam exhibit continued safe and appropriate use of controlled substances.      EMR Dragon/Transcription disclaimer:   Much of this encounter note is an electronic transcription/translation of spoken language to printed text. The electronic translation of spoken  language may permit erroneous, or at times, nonsensical words or phrases to be inadvertently transcribed; Although I have reviewed the note for such errors, some may still exist.

## 2021-04-15 NOTE — TELEPHONE ENCOUNTER
He rated his pain a 7 yesterday on exam and was ambulatory with a cane.  If he has had such a significant neurologic change that he is not longer ambulatory, he will need ER evaluation.

## 2021-04-19 NOTE — TELEPHONE ENCOUNTER
NOTIFIED PATIENT ABOUT THE ER HE SAID HIS INSURANCE DOES NOT COVER ER VISITS. SUGGESTED A FOLLOW UP VISIT WITH PHYSICIAN HE DECLINED. INSURANCE WORKING ON AUTH TO SEE IF WE CAN GET HIM APPROVED SOONER.

## 2021-04-26 ENCOUNTER — HOSPITAL ENCOUNTER (OUTPATIENT)
Dept: PAIN MEDICINE | Facility: HOSPITAL | Age: 51
Discharge: HOME | End: 2021-04-26

## 2021-04-26 VITALS
DIASTOLIC BLOOD PRESSURE: 93 MMHG | RESPIRATION RATE: 16 BRPM | BODY MASS INDEX: 23.5 KG/M2 | TEMPERATURE: 98.7 F | SYSTOLIC BLOOD PRESSURE: 146 MMHG | HEIGHT: 75 IN | WEIGHT: 189 LBS | HEART RATE: 79 BPM | OXYGEN SATURATION: 100 %

## 2021-04-26 DIAGNOSIS — M51.26 DISPLACEMENT OF LUMBAR INTERVERTEBRAL DISC WITHOUT MYELOPATHY: Primary | ICD-10-CM

## 2021-04-26 DIAGNOSIS — R52 PAIN: ICD-10-CM

## 2021-04-26 PROCEDURE — 77003 FLUOROGUIDE FOR SPINE INJECT: CPT

## 2021-04-26 PROCEDURE — 25010000002 METHYLPREDNISOLONE PER 40 MG: Performed by: STUDENT IN AN ORGANIZED HEALTH CARE EDUCATION/TRAINING PROGRAM

## 2021-04-26 PROCEDURE — 0 IOPAMIDOL 41 % SOLUTION

## 2021-04-26 PROCEDURE — 62323 NJX INTERLAMINAR LMBR/SAC: CPT | Performed by: STUDENT IN AN ORGANIZED HEALTH CARE EDUCATION/TRAINING PROGRAM

## 2021-04-26 RX ORDER — BUPIVACAINE HYDROCHLORIDE 2.5 MG/ML
INJECTION, SOLUTION EPIDURAL; INFILTRATION; INTRACAUDAL
Status: DISCONTINUED
Start: 2021-04-26 | End: 2021-04-27 | Stop reason: HOSPADM

## 2021-04-26 RX ORDER — BUPIVACAINE HYDROCHLORIDE 5 MG/ML
10 INJECTION, SOLUTION EPIDURAL; INTRACAUDAL ONCE
Status: COMPLETED | OUTPATIENT
Start: 2021-04-26 | End: 2021-04-26

## 2021-04-26 RX ORDER — METHYLPREDNISOLONE ACETATE 40 MG/ML
40 INJECTION, SUSPENSION INTRA-ARTICULAR; INTRALESIONAL; INTRAMUSCULAR; SOFT TISSUE ONCE
Status: COMPLETED | OUTPATIENT
Start: 2021-04-26 | End: 2021-04-26

## 2021-04-26 RX ADMIN — BUPIVACAINE HYDROCHLORIDE 3 ML: 5 INJECTION, SOLUTION EPIDURAL; INTRACAUDAL at 14:36

## 2021-04-26 RX ADMIN — METHYLPREDNISOLONE ACETATE 40 MG: 40 INJECTION, SUSPENSION INTRA-ARTICULAR; INTRALESIONAL; INTRAMUSCULAR; SOFT TISSUE at 14:36

## 2021-04-26 RX ADMIN — IOPAMIDOL 1.5 ML: 408 INJECTION, SOLUTION INTRATHECAL at 14:36

## 2021-04-26 NOTE — PROCEDURES
Lumbar Epidural Steroid Injection  [facility]    PREOPERATIVE DIAGNOSIS:   Chronic low back pain, Lumbar Degenerative Disc Disease and Lumbar Disc Displacement  POSTOPERATIVE DIAGNOSIS:  Same as preop diagnosis    PROCEDURE:   Lumbar Epidural Steroid Injection, Therapeutic Translaminar Injection, with epidurogram, at  L5/S1 level    PRE-PROCEDURE DISCUSSION WITH PATIENT:    Risks and complications were discussed with the patient prior to starting the procedure and informed consent was obtained.  We discussed various topics including but not limited to bleeding, infection, injury, paralysis, nerve injury, dural puncture, coma, death, worsening of clinical picture, lack of pain relief, and postprocedural soreness.    SURGEON:  Garcia Patel MD    REASON FOR PROCEDURE:    Degenerative changes are noted in the area., Stenotic area is noted, and is likely contributing to this chronic &/or recurrent pain.  and Radiating pattern of pain is likely consistent with degenerative changes in the area.    SEDATION:  Patient declined administration of moderate sedation    ANESTHETIC:  Marcaine 0.25%  STEROID:   Methylprednisolone (DEPO MEDROL) 40mg/ml    DESCRIPTON OF PROCEDURE:    After obtaining informed consent, I.V. was not started in the preop area.   The patient was taken to the operating room and placed in the prone position. All pressure points were well padded.  The lumbar spine area was prepped with Chloraprep and draped in a sterile fashion.  Under fluoroscopic guidance, the above mentioned interlaminar space was identified. Skin and subcutaneous tissues were anesthetized with 1% lidocaine in the middle of the space. A Tuohy needle was introduced through the skin and advanced to this interlaminar space and into the epidural space under fluoroscopic guidance and verified with loss-of-resistance technique to air.  After confirming the position of the needle with the fluoroscope with all the views, and after  aspiration was confirmed negative for blood and CSF, 1.5 mL of Omnipaque was injected.  After seeing appropriate epidurogram with lateral and PA views, a total of 4 cc solution was injected, consisting of 3cc of local anesthetic as above, with normal saline and injectable steroid as above.     ESTIMATED BLOOD LOSS:  <5 mL  SPECIMENS:  None    COMPLICATIONS:     No complications were noted., There was no indication of vascular uptake on live injection of contrast dye., There was no indication of intrathecal uptake on live injection of contrast dye. and There was not any evidence of dural puncture.      TOLERANCE & DISCHARGE CONDITION:    The patient tolerated the procedure well.  The patient was transported to the recovery area without difficulties.  The patient was discharged to home under the care of family in stable and satisfactory condition.    PLAN OF CARE:  1. The patient was given our standard instruction sheet.  2. The patient will Return to clinic 2-3 wks  3. The patient will resume all medications as per the medication reconciliation sheet.

## 2021-04-26 NOTE — ADDENDUM NOTE
Encounter addended by: Brigida Velazquez RN on: 4/26/2021 3:51 PM   Actions taken: MAR administration accepted

## 2021-04-27 ENCOUNTER — TELEPHONE (OUTPATIENT)
Dept: PAIN MEDICINE | Facility: CLINIC | Age: 51
End: 2021-04-27

## 2021-04-27 RX ORDER — HYDROCODONE BITARTRATE AND ACETAMINOPHEN 5; 325 MG/1; MG/1
1 TABLET ORAL 3 TIMES DAILY PRN
Qty: 30 TABLET | Refills: 0 | Status: SHIPPED | OUTPATIENT
Start: 2021-04-27 | End: 2021-05-12 | Stop reason: SDUPTHER

## 2021-04-27 NOTE — TELEPHONE ENCOUNTER
Pt's wife called and stated that the epidural did not work that her  is still in excruciating pain. That she wants him out of pain. I explained that it could take a few days for the injection to work but she was not happy with that I offered to connect her to the nurses for any additional questions but she refused. Said that she was tired of seeing him suffer and that they can't go to the ER because their insurance will not cover it that's why the PCP sent him here. Please advise.

## 2021-04-27 NOTE — TELEPHONE ENCOUNTER
Spoke with patient notified of the script at the pharmacy, offered a referral for neurosurgery but he already has an appt with Dr. Cox. Made a two week follow up appt with Dr. Patel per office note on 4/26.

## 2021-04-27 NOTE — TELEPHONE ENCOUNTER
He has really tied my hands in terms of providing him appropriate care.  He reportedly has Dr. Alonso as a PCP however I see no progress notes from her.  Per Dr. Buckner's note, he should have also been referred to neurosurgery for evaluation but I again don't see any evidence of that.  He reports having pain for approximately 6 mos however there have been several ER trips just to [facility] for a similar problem over the past several years.  It is very unclear what has changed over the past 2 weeks since his initial consultation requiring his wife to demand narcotic pain medication twice whereas based upon his INSPECT he has not taken any controlled substances aside from a rare tramadol or Norco in several years.  Will send in a short course of Norco 5 TID.  Given his admission in 2018 for anxiety, depression, and acute alcohol intoxication, HE IS NOT a candidate for long-term narcotic therapy.  If he wishes, I can refer to the [facility] department for consideration of surgical intervention, but aside from that I don't have much else to offer him.

## 2021-05-12 ENCOUNTER — OFFICE VISIT (OUTPATIENT)
Dept: PAIN MEDICINE | Facility: CLINIC | Age: 51
End: 2021-05-12

## 2021-05-12 VITALS
WEIGHT: 189 LBS | RESPIRATION RATE: 16 BRPM | HEART RATE: 88 BPM | BODY MASS INDEX: 23.5 KG/M2 | SYSTOLIC BLOOD PRESSURE: 167 MMHG | OXYGEN SATURATION: 98 % | HEIGHT: 75 IN | DIASTOLIC BLOOD PRESSURE: 101 MMHG

## 2021-05-12 DIAGNOSIS — Z79.899 HIGH RISK MEDICATION USE: Primary | ICD-10-CM

## 2021-05-12 DIAGNOSIS — R52 PAIN: Primary | ICD-10-CM

## 2021-05-12 DIAGNOSIS — M51.26 DISPLACEMENT OF LUMBAR INTERVERTEBRAL DISC WITHOUT MYELOPATHY: ICD-10-CM

## 2021-05-12 PROCEDURE — 99214 OFFICE O/P EST MOD 30 MIN: CPT | Performed by: STUDENT IN AN ORGANIZED HEALTH CARE EDUCATION/TRAINING PROGRAM

## 2021-05-12 RX ORDER — HYDROCODONE BITARTRATE AND ACETAMINOPHEN 5; 325 MG/1; MG/1
1 TABLET ORAL EVERY 8 HOURS PRN
Qty: 90 TABLET | Refills: 0 | OUTPATIENT
Start: 2021-05-12 | End: 2021-06-28

## 2021-05-12 NOTE — PROGRESS NOTES
CHIEF COMPLAINT  Chief Complaint   Patient presents with   • Back Pain     LOWER--- NO CBD USE-- HYDROCODONE OUT OF MEDS  LAST DOSE 5/5/21--- PT STATED MED LIST THE  SAME       Primary Care  Radha Alonso MD    Subjective   Juaquin Pena is a 51 y.o. male  who presents for chronic low back pain and radicular low back pain.  He states that he has had low back pain for many years as he always worked fairly physical job.  He states that most recently, the back pain began to get to the point where it was significantly impacting his ability to move and function.  He states that he has had at least one trip to the emergency room for the back pain.  MRI in the emergency room did show diffuse disc bulges throughout the lumbar spine.  He describes pain radiating down bilateral lower extremities, left greater than right.  The pain is worse with any type of physical activity movement, and is slightly improved with rest.  He denies any red flag symptoms such as loss of bowel or bladder    Back Pain  Associated symptoms include numbness and weakness.        Location: Low back with radiation left greater than right lower extremity  Onset: Many years ago, recently worse  Duration: Progressively worsening  Timing: Constant throughout the day  Quality: Sharp, stabbing with numbness and tingling  Severity: Today: 10       Last Week: 10       Worst: 10  Modifying Factors: The pain is worse with any type of physical activity and movement.  The pain is slightly improved with rest    Physical Therapy: no    Interval Update 05/12/2021: No relief whatsoever with lumbar epidural.  Requesting more pain medicine.  Scheduled to see neurosurgery tomorrow.    The following portions of the patient's history were reviewed and updated as appropriate: allergies, current medications, past family history, past medical history, past social history, past surgical history and problem list.      Current Outpatient Medications:   •  gabapentin  "(NEURONTIN) 300 MG capsule, Take 1 capsule by mouth 3 (Three) Times a Day., Disp: 90 capsule, Rfl: 0  •  HYDROcodone-acetaminophen (NORCO) 5-325 MG per tablet, Take 1 tablet by mouth Every 8 (Eight) Hours As Needed for Severe Pain ., Disp: 90 tablet, Rfl: 0  •  ibuprofen (ADVIL,MOTRIN) 200 MG tablet, , Disp: , Rfl:   •  lidocaine (LIDODERM) 5 %, Place 1 patch on the skin as directed by provider Daily. Remove & Discard patch within 12 hours or as directed by MD, Disp: 6 patch, Rfl: 0  •  metaxalone (SKELAXIN) 800 MG tablet, Take 1 tablet by mouth 3 (Three) Times a Day As Needed for Muscle Spasms (And pain)., Disp: 10 tablet, Rfl: 0  •  methylPREDNISolone (MEDROL, FRANSISCO,) 4 MG tablet, Take as directed on package instructions., Disp: 21 tablet, Rfl: 0  •  TiZANidine (ZANAFLEX) 2 MG capsule, Take 1 capsule by mouth 3 (Three) Times a Day., Disp: 10 capsule, Rfl: 0    Review of Systems   Constitutional: Positive for activity change.   Musculoskeletal: Positive for arthralgias, back pain and gait problem.   Neurological: Positive for weakness and numbness.       Vitals:    05/12/21 1455   BP: (!) 167/101   Pulse: 88   Resp: 16   SpO2: 98%   Weight: 85.7 kg (189 lb)   Height: 190.5 cm (75\")   PainSc: 10-Worst pain ever       Objective   Physical Exam  Vitals and nursing note reviewed.   Constitutional:       General: He is not in acute distress.     Appearance: Normal appearance. He is well-developed and normal weight.   Neck:      Trachea: No tracheal deviation.   Musculoskeletal:      Comments: Lumbar Spine Exam:  Tender to palpation over the lumbar paraspinal musculature Yes  Limited range of motion secondary to pain Yes  Facet loading positive: bilateral  Facets tender to palpation: bilateral  Straight leg raise test positive: left         Neurological:      Mental Status: He is alert.      Comments: Left lower extremity tender to palpation across the anterior aspect of the thigh with positive straight leg raise on the " left.  Also, diffusely weak in the left lower extremity due to pain           Assessment/Plan   Problems Addressed this Visit     None      Visit Diagnoses     Pain    -  Primary    Displacement of lumbar intervertebral disc without myelopathy        Relevant Medications    HYDROcodone-acetaminophen (NORCO) 5-325 MG per tablet      Diagnoses       Codes Comments    Pain    -  Primary ICD-10-CM: R52  ICD-9-CM: 780.96     Displacement of lumbar intervertebral disc without myelopathy     ICD-10-CM: M51.26  ICD-9-CM: 722.10           Plan:  1. Refill hydrocodone 5 mg 3 times daily  2. Await neurosurgical review  3. UDS and contract today  --- Follow-up 1 month           INSPECT REPORT    As part of the patient's treatment plan, I may be prescribing controlled substances. The patient has been made aware of appropriate use of such medications, including potential risk of somnolence, limited ability to drive and/or work safely, and the potential for dependence or overdose. It has also bee made clear that these medications are for use by this patient only, without concomitant use of alcohol or other substances unless prescribed.     Patient has completed prescribing agreement detailing terms of continued prescribing of controlled substances, including monitoring TERENCE reports, urine drug screening, and pill counts if necessary. The patient is aware that inappropriate use will results in cessation of prescribing such medications.    INSPECT report has been reviewed and scanned into the patient's chart.    As the clinician, I personally reviewed the INSPECT from 5/11/2021.    History and physical exam exhibit continued safe and appropriate use of controlled substances.      EMR Dragon/Transcription disclaimer:   Much of this encounter note is an electronic transcription/translation of spoken language to printed text. The electronic translation of spoken language may permit erroneous, or at times, nonsensical words or phrases  to be inadvertently transcribed; Although I have reviewed the note for such errors, some may still exist.

## 2021-06-09 ENCOUNTER — APPOINTMENT (OUTPATIENT)
Dept: PAIN MEDICINE | Facility: CLINIC | Age: 51
End: 2021-06-09

## 2021-06-28 ENCOUNTER — HOSPITAL ENCOUNTER (EMERGENCY)
Facility: HOSPITAL | Age: 51
Discharge: HOME | End: 2021-06-28
Admitting: EMERGENCY MEDICINE

## 2021-06-28 VITALS
RESPIRATION RATE: 16 BRPM | BODY MASS INDEX: 23.68 KG/M2 | DIASTOLIC BLOOD PRESSURE: 104 MMHG | TEMPERATURE: 97.6 F | HEART RATE: 121 BPM | WEIGHT: 190.48 LBS | SYSTOLIC BLOOD PRESSURE: 161 MMHG | OXYGEN SATURATION: 98 % | HEIGHT: 75 IN

## 2021-06-28 DIAGNOSIS — K04.7 DENTAL ABSCESS: ICD-10-CM

## 2021-06-28 DIAGNOSIS — R68.84 PAIN IN UPPER JAW: Primary | ICD-10-CM

## 2021-06-28 PROCEDURE — 63710000001 ONDANSETRON ODT 4 MG TABLET DISPERSIBLE: Performed by: NURSE PRACTITIONER

## 2021-06-28 PROCEDURE — 99283 EMERGENCY DEPT VISIT LOW MDM: CPT

## 2021-06-28 RX ORDER — OXYCODONE HYDROCHLORIDE 5 MG/1
5 TABLET ORAL ONCE
Status: COMPLETED | OUTPATIENT
Start: 2021-06-28 | End: 2021-06-28

## 2021-06-28 RX ORDER — AMOXICILLIN AND CLAVULANATE POTASSIUM 875; 125 MG/1; MG/1
1 TABLET, FILM COATED ORAL 2 TIMES DAILY
Qty: 20 TABLET | Refills: 0 | Status: SHIPPED | OUTPATIENT
Start: 2021-06-28 | End: 2021-07-08

## 2021-06-28 RX ORDER — HYDROCODONE BITARTRATE AND ACETAMINOPHEN 7.5; 325 MG/1; MG/1
1 TABLET ORAL EVERY 8 HOURS PRN
Qty: 15 TABLET | Refills: 0 | Status: SHIPPED | OUTPATIENT
Start: 2021-06-28

## 2021-06-28 RX ORDER — ONDANSETRON 4 MG/1
4 TABLET, ORALLY DISINTEGRATING ORAL ONCE
Status: COMPLETED | OUTPATIENT
Start: 2021-06-28 | End: 2021-06-28

## 2021-06-28 RX ORDER — AMOXICILLIN AND CLAVULANATE POTASSIUM 875; 125 MG/1; MG/1
1 TABLET, FILM COATED ORAL ONCE
Status: COMPLETED | OUTPATIENT
Start: 2021-06-28 | End: 2021-06-28

## 2021-06-28 RX ADMIN — OXYCODONE 5 MG: 5 TABLET ORAL at 01:14

## 2021-06-28 RX ADMIN — ONDANSETRON 4 MG: 4 TABLET, ORALLY DISINTEGRATING ORAL at 01:14

## 2021-06-28 RX ADMIN — AMOXICILLIN AND CLAVULANATE POTASSIUM 1 TABLET: 875; 125 TABLET, FILM COATED ORAL at 01:14

## 2021-07-30 ENCOUNTER — HOSPITAL ENCOUNTER (EMERGENCY)
Facility: HOSPITAL | Age: 51
Discharge: HOME | End: 2021-07-30
Attending: EMERGENCY MEDICINE | Admitting: EMERGENCY MEDICINE

## 2021-07-30 VITALS
BODY MASS INDEX: 23.85 KG/M2 | WEIGHT: 191.8 LBS | SYSTOLIC BLOOD PRESSURE: 130 MMHG | DIASTOLIC BLOOD PRESSURE: 80 MMHG | RESPIRATION RATE: 18 BRPM | HEART RATE: 80 BPM | OXYGEN SATURATION: 99 % | HEIGHT: 75 IN | TEMPERATURE: 98.2 F

## 2021-07-30 DIAGNOSIS — H65.93 FLUID LEVEL BEHIND TYMPANIC MEMBRANE OF BOTH EARS: Primary | ICD-10-CM

## 2021-07-30 PROCEDURE — 99282 EMERGENCY DEPT VISIT SF MDM: CPT

## 2021-07-30 RX ORDER — BROMPHENIRAMINE MALEATE, PSEUDOEPHEDRINE HYDROCHLORIDE, AND DEXTROMETHORPHAN HYDROBROMIDE 2; 30; 10 MG/5ML; MG/5ML; MG/5ML
5 SYRUP ORAL 4 TIMES DAILY PRN
Qty: 118 ML | Refills: 0 | Status: SHIPPED | OUTPATIENT
Start: 2021-07-30

## 2021-12-29 ENCOUNTER — HOSPITAL ENCOUNTER (EMERGENCY)
Facility: HOSPITAL | Age: 51
Discharge: HOME | End: 2021-12-29
Attending: EMERGENCY MEDICINE | Admitting: EMERGENCY MEDICINE

## 2021-12-29 VITALS
OXYGEN SATURATION: 98 % | RESPIRATION RATE: 16 BRPM | HEIGHT: 75 IN | HEART RATE: 92 BPM | TEMPERATURE: 98 F | DIASTOLIC BLOOD PRESSURE: 74 MMHG | BODY MASS INDEX: 24.87 KG/M2 | WEIGHT: 200 LBS | SYSTOLIC BLOOD PRESSURE: 179 MMHG

## 2021-12-29 DIAGNOSIS — S61.511A LACERATION OF RIGHT WRIST, INITIAL ENCOUNTER: Primary | ICD-10-CM

## 2021-12-29 PROCEDURE — 99282 EMERGENCY DEPT VISIT SF MDM: CPT

## 2022-02-25 NOTE — PROGRESS NOTES
Spoke with patient notified him of the prescription offered him a referral to Neurosurgery said he already has an joesph set up with Dr. Cox. Made a two week follow with Dr. Patel per chart note from 4/26   (4) no limitation

## 2022-04-24 ENCOUNTER — HOSPITAL ENCOUNTER (EMERGENCY)
Facility: HOSPITAL | Age: 52
Discharge: HOME | End: 2022-04-24
Attending: EMERGENCY MEDICINE | Admitting: EMERGENCY MEDICINE

## 2022-04-24 VITALS
TEMPERATURE: 98 F | SYSTOLIC BLOOD PRESSURE: 160 MMHG | DIASTOLIC BLOOD PRESSURE: 96 MMHG | OXYGEN SATURATION: 98 % | HEART RATE: 75 BPM | HEIGHT: 72 IN | BODY MASS INDEX: 26.37 KG/M2 | RESPIRATION RATE: 18 BRPM | WEIGHT: 194.67 LBS

## 2022-04-24 DIAGNOSIS — R68.84 JAW PAIN: Primary | ICD-10-CM

## 2022-04-24 DIAGNOSIS — K08.89 PAIN, DENTAL: ICD-10-CM

## 2022-04-24 PROCEDURE — 99283 EMERGENCY DEPT VISIT LOW MDM: CPT

## 2022-04-24 RX ORDER — AMOXICILLIN AND CLAVULANATE POTASSIUM 875; 125 MG/1; MG/1
1 TABLET, FILM COATED ORAL 2 TIMES DAILY
Qty: 14 TABLET | Refills: 0 | Status: SHIPPED | OUTPATIENT
Start: 2022-04-24 | End: 2022-05-01

## 2022-04-24 RX ORDER — TRAMADOL HYDROCHLORIDE 50 MG/1
50 TABLET ORAL EVERY 6 HOURS PRN
Qty: 12 TABLET | Refills: 0 | Status: SHIPPED | OUTPATIENT
Start: 2022-04-24

## 2022-04-24 RX ORDER — TRAMADOL HYDROCHLORIDE 50 MG/1
50 TABLET ORAL ONCE
Status: COMPLETED | OUTPATIENT
Start: 2022-04-24 | End: 2022-04-24

## 2022-04-24 RX ADMIN — TRAMADOL HYDROCHLORIDE 50 MG: 50 TABLET, COATED ORAL at 09:38

## 2022-04-24 RX ADMIN — ALUMINA, MAGNESIA, AND SIMETHICONE: 2400; 2400; 240 SUSPENSION ORAL at 09:38

## 2022-04-24 NOTE — ED PROVIDER NOTES
Subjective   Chief Complaint: Dental pain    Patient is a 52-year-old  male history of seizure, previous stroke presents to the ER with complaints of jaw pain for the last 2 days.  Patient has very poor dentition, states that he has had a few teeth removed but has had issues scheduling root canal with another tooth extractions procedures with his dentist due to his insurance.  He states the pain is mostly in the right lower jaw, described as a constant throbbing sharp pain that he rates a 10/10.  He denies any bleeding or drainage or lesions in his mouth.  Denies any sore throat difficulty swallowing or difficulty breathing.  Patient has extremely poor dentition throughout.  He denies any chest pain, shortness of breath, abdominal pain, nausea vomiting or diarrhea.  No fever or chills.    PCP: Radha Alonso      History provided by:  Patient      Review of Systems   Constitutional: Negative for chills and fever.   HENT: Positive for dental problem and facial swelling. Negative for congestion, nosebleeds, postnasal drip, rhinorrhea, sore throat and trouble swallowing.    Respiratory: Negative for shortness of breath and wheezing.    Cardiovascular: Negative for chest pain.   Gastrointestinal: Negative for abdominal pain, diarrhea, nausea and vomiting.   Genitourinary: Negative for dysuria.   Musculoskeletal: Negative for myalgias.   Skin: Negative for rash.   Neurological: Negative for dizziness, weakness and headaches.   Psychiatric/Behavioral: Negative for behavioral problems.   All other systems reviewed and are negative.      Past Medical History:   Diagnosis Date   • Arthritis    • Low back pain    • Seizure (CMS/[facility]ly [facility])    • Stroke (cerebrum) (CMS/Formerly [facility])     2018       Allergies   Allergen Reactions   • Morphine Unknown - High Severity     Unknown, pt mother told him he had a bad reaction as a child        Past Surgical History:   Procedure Laterality Date   • FINGER SURGERY      rt  little finger cut off     • HERNIA REPAIR         Family History   Problem Relation Age of Onset   • Hypertension Mother    • COPD Mother    • Diabetes Mother        Social History     Socioeconomic History   • Marital status:    Tobacco Use   • Smoking status: Current Every Day Smoker     Types: Cigarettes   • Smokeless tobacco: Never Used   • Tobacco comment: 3 CIGS A DAY   Vaping Use   • Vaping Use: Never used   Substance and Sexual Activity   • Alcohol use: Yes     Comment:  1 to 2  beers a week   • Drug use: Yes     Types: Marijuana     Comment:  has used  last use 4/13/21   • Sexual activity: Defer           Objective   Physical Exam  Vitals and nursing note reviewed.   Constitutional:       General: He is not in acute distress.     Appearance: Normal appearance. He is normal weight. He is not diaphoretic.   HENT:      Mouth/Throat:      Lips: Pink.      Mouth: Mucous membranes are moist. No oral lesions or angioedema.      Dentition: Abnormal dentition. Does not have dentures. Dental tenderness, gingival swelling and dental caries present. No dental abscesses.      Tongue: No lesions.      Pharynx: Oropharynx is clear. Uvula midline. No posterior oropharyngeal erythema.      Tonsils: No tonsillar exudate.   Eyes:      Extraocular Movements: Extraocular movements intact.      Pupils: Pupils are equal, round, and reactive to light.   Cardiovascular:      Pulses: Normal pulses.      Heart sounds: Normal heart sounds. No murmur heard.  Pulmonary:      Effort: Pulmonary effort is normal.      Breath sounds: Normal breath sounds. No wheezing.   Abdominal:      General: Abdomen is flat.      Tenderness: There is no abdominal tenderness.   Neurological:      General: No focal deficit present.      Mental Status: He is alert and oriented to person, place, and time.   Psychiatric:         Mood and Affect: Mood normal.         Behavior: Behavior normal.         Procedures           ED Course    /96 (BP Location: Left arm, Patient  "Position: Sitting)   Pulse 75   Temp 98 °F (36.7 °C)   Resp 18   Ht 182.9 cm (72\")   Wt 88.3 kg (194 lb 10.7 oz)   SpO2 98%   BMI 26.40 kg/m²   Labs Reviewed - No data to display  Medications   traMADol (ULTRAM) tablet 50 mg (50 mg Oral Given 4/24/22 0938)   dental ball oral suspension with diphenhydrAMINE ( Swish & Spit Given 4/24/22 0938)     No radiology results for the last day                                               MDM  Number of Diagnoses or Management Options  Jaw pain  Pain, dental  Diagnosis management comments: MEDICAL DECISION  Epic Chart Review: No recent admissions  Comorbidities: Hypertension, previous stroke  Differentials: Dental abscess, dental cavity, ulceration, sore, peritonsillar abscess, facial cellulitis; this list is not all inclusive and does not constitute the entirety of considered causes  Radiology interpretation:  Images reviewed by me and interpreted by radiologist, not warranted  Lab interpretation:  Labs viewed by me significant for, not warranted    While in the ED appropriate PPE was worn during exam and throughout all encounters with the patient.  Patient had the above evaluation.  Patient given dental or solution as well as tramadol for pain.  Dental exam patient has significant dental caries burden throughout his entire mouth and jaw.  There is no obvious dental abscess, no fluctuance palpated on exam.  Mild facial tenderness without erythema, superficial fluctuance or induration to suggest facial abscess either.  Patient reported some pain relief after dental balls solution and tramadol.  Inspect reviewed.  Patient discharged with prescription for tramadol and Augmentin and he was given a list of contacts for dental evaluation and surgery.  Patient felt to be stable for discharge.    Discharge plan and instructions were discussed with the patient who verbalized understanding and is in agreement with the plan, all questions were answered at this time.  Patient is " aware of signs symptoms that would require immediate return to the emergency room.  Patient understands importance of following up with primary care provider for further evaluation and worsening concerns as well as blood pressure recheck in the next 4 weeks.    Patient was discharged in improved stable condition with an upright steady gait.      Patient Progress  Patient progress: stable      Final diagnoses:   Jaw pain   Pain, dental       ED Disposition  ED Disposition     ED Disposition   Discharge    Condition   Stable    Comment   --             Radha Alonso MD  [address]  [address]  [place] [address] [phone]    Schedule an appointment as soon as possible for a visit in 2 days  As needed, If symptoms worsen         Medication List      New Prescriptions    amoxicillin-clavulanate 875-125 MG per tablet  Commonly known as: AUGMENTIN  Take 1 tablet by mouth 2 (Two) Times a Day for 7 days.     traMADol 50 MG tablet  Commonly known as: ULTRAM  Take 1 tablet by mouth Every 6 (Six) Hours As Needed for Moderate Pain .           Where to Get Your Medications      These medications were sent to [facility] - [place], [place] - [address] - [phone]  - [phone] FX  [address] [address] [place] [address]    Phone: [phone]   · amoxicillin-clavulanate 875-125 MG per tablet  · traMADol 50 MG tablet          Tereza Romero PA  04/24/22 [id number]

## 2022-04-24 NOTE — DISCHARGE INSTRUCTIONS
Take Augmentin antibiotics to completion  Take tramadol as needed for pain.    Use Orajel or dental solution at home for additional pain relief.    Call dental clinics as listed on the handout to have further evaluation and dental procedures as needed    Return to the ER for new or worsening symptoms

## 2023-04-18 ENCOUNTER — APPOINTMENT (OUTPATIENT)
Dept: CT IMAGING | Facility: HOSPITAL | Age: 53
End: 2023-04-18
Payer: MEDICAID

## 2023-04-18 ENCOUNTER — HOSPITAL ENCOUNTER (EMERGENCY)
Facility: HOSPITAL | Age: 53
Discharge: HOME | End: 2023-04-18
Attending: EMERGENCY MEDICINE
Payer: MEDICAID

## 2023-04-18 VITALS
DIASTOLIC BLOOD PRESSURE: 86 MMHG | TEMPERATURE: 98 F | HEIGHT: 75 IN | HEART RATE: 70 BPM | SYSTOLIC BLOOD PRESSURE: 170 MMHG | RESPIRATION RATE: 18 BRPM | WEIGHT: 199.74 LBS | BODY MASS INDEX: 24.83 KG/M2 | OXYGEN SATURATION: 99 %

## 2023-04-18 DIAGNOSIS — J01.90 ACUTE SINUSITIS, RECURRENCE NOT SPECIFIED, UNSPECIFIED LOCATION: ICD-10-CM

## 2023-04-18 DIAGNOSIS — R51.9 NONINTRACTABLE HEADACHE, UNSPECIFIED CHRONICITY PATTERN, UNSPECIFIED HEADACHE TYPE: Primary | ICD-10-CM

## 2023-04-18 DIAGNOSIS — K08.9 DENTAL DISEASE: ICD-10-CM

## 2023-04-18 LAB
ANION GAP SERPL CALCULATED.3IONS-SCNC: 7 MMOL/L (ref 5–15)
BASOPHILS # BLD AUTO: 0.1 10*3/MM3 (ref 0–0.2)
BASOPHILS NFR BLD AUTO: 0.9 % (ref 0–1.5)
BUN SERPL-MCNC: 16 MG/DL (ref 6–20)
BUN/CREAT SERPL: 19.3 (ref 7–25)
CALCIUM SPEC-SCNC: 9.2 MG/DL (ref 8.6–10.5)
CHLORIDE SERPL-SCNC: 102 MMOL/L (ref 98–107)
CO2 SERPL-SCNC: 30 MMOL/L (ref 22–29)
CREAT SERPL-MCNC: 0.83 MG/DL (ref 0.76–1.27)
DEPRECATED RDW RBC AUTO: 45.9 FL (ref 37–54)
EGFRCR SERPLBLD CKD-EPI 2021: 104.7 ML/MIN/1.73
EOSINOPHIL # BLD AUTO: 0.1 10*3/MM3 (ref 0–0.4)
EOSINOPHIL NFR BLD AUTO: 1 % (ref 0.3–6.2)
ERYTHROCYTE [DISTWIDTH] IN BLOOD BY AUTOMATED COUNT: 13.8 % (ref 12.3–15.4)
GLUCOSE SERPL-MCNC: 117 MG/DL (ref 65–99)
HCT VFR BLD AUTO: 39.7 % (ref 37.5–51)
HGB BLD-MCNC: 13.1 G/DL (ref 13–17.7)
LYMPHOCYTES # BLD AUTO: 1.5 10*3/MM3 (ref 0.7–3.1)
LYMPHOCYTES NFR BLD AUTO: 16.9 % (ref 19.6–45.3)
MCH RBC QN AUTO: 29.8 PG (ref 26.6–33)
MCHC RBC AUTO-ENTMCNC: 33 G/DL (ref 31.5–35.7)
MCV RBC AUTO: 90.4 FL (ref 79–97)
MONOCYTES # BLD AUTO: 1 10*3/MM3 (ref 0.1–0.9)
MONOCYTES NFR BLD AUTO: 11.5 % (ref 5–12)
NEUTROPHILS NFR BLD AUTO: 6.3 10*3/MM3 (ref 1.7–7)
NEUTROPHILS NFR BLD AUTO: 69.7 % (ref 42.7–76)
NRBC BLD AUTO-RTO: 0 /100 WBC (ref 0–0.2)
PLATELET # BLD AUTO: 342 10*3/MM3 (ref 140–450)
PMV BLD AUTO: 7.9 FL (ref 6–12)
POTASSIUM SERPL-SCNC: 4 MMOL/L (ref 3.5–5.2)
RBC # BLD AUTO: 4.39 10*6/MM3 (ref 4.14–5.8)
SODIUM SERPL-SCNC: 139 MMOL/L (ref 136–145)
WBC NRBC COR # BLD: 9 10*3/MM3 (ref 3.4–10.8)

## 2023-04-18 PROCEDURE — 70498 CT ANGIOGRAPHY NECK: CPT

## 2023-04-18 PROCEDURE — 70450 CT HEAD/BRAIN W/O DYE: CPT

## 2023-04-18 PROCEDURE — 99284 EMERGENCY DEPT VISIT MOD MDM: CPT

## 2023-04-18 PROCEDURE — 70486 CT MAXILLOFACIAL W/O DYE: CPT

## 2023-04-18 PROCEDURE — 96365 THER/PROPH/DIAG IV INF INIT: CPT

## 2023-04-18 PROCEDURE — 25010000002 ONDANSETRON PER 1 MG: Performed by: EMERGENCY MEDICINE

## 2023-04-18 PROCEDURE — 70496 CT ANGIOGRAPHY HEAD: CPT

## 2023-04-18 PROCEDURE — 85025 COMPLETE CBC W/AUTO DIFF WBC: CPT | Performed by: EMERGENCY MEDICINE

## 2023-04-18 PROCEDURE — 25510000001 IOPAMIDOL PER 1 ML: Performed by: EMERGENCY MEDICINE

## 2023-04-18 PROCEDURE — 96375 TX/PRO/DX INJ NEW DRUG ADDON: CPT

## 2023-04-18 PROCEDURE — 25010000002 METOCLOPRAMIDE PER 10 MG: Performed by: EMERGENCY MEDICINE

## 2023-04-18 PROCEDURE — 25010000002 HYDROMORPHONE 1 MG/ML SOLUTION: Performed by: EMERGENCY MEDICINE

## 2023-04-18 PROCEDURE — 25010000002 DIPHENHYDRAMINE PER 50 MG: Performed by: EMERGENCY MEDICINE

## 2023-04-18 PROCEDURE — 80048 BASIC METABOLIC PNL TOTAL CA: CPT | Performed by: EMERGENCY MEDICINE

## 2023-04-18 RX ORDER — METOCLOPRAMIDE HYDROCHLORIDE 5 MG/ML
10 INJECTION INTRAMUSCULAR; INTRAVENOUS ONCE
Status: COMPLETED | OUTPATIENT
Start: 2023-04-18 | End: 2023-04-18

## 2023-04-18 RX ORDER — SODIUM CHLORIDE 0.9 % (FLUSH) 0.9 %
10 SYRINGE (ML) INJECTION AS NEEDED
Status: DISCONTINUED | OUTPATIENT
Start: 2023-04-18 | End: 2023-04-18 | Stop reason: HOSPADM

## 2023-04-18 RX ORDER — AMOXICILLIN AND CLAVULANATE POTASSIUM 875; 125 MG/1; MG/1
1 TABLET, FILM COATED ORAL 2 TIMES DAILY
Qty: 14 TABLET | Refills: 0 | Status: SHIPPED | OUTPATIENT
Start: 2023-04-18

## 2023-04-18 RX ORDER — HYDROCODONE BITARTRATE AND ACETAMINOPHEN 5; 325 MG/1; MG/1
1 TABLET ORAL EVERY 6 HOURS PRN
Qty: 12 TABLET | Refills: 0 | Status: SHIPPED | OUTPATIENT
Start: 2023-04-18

## 2023-04-18 RX ORDER — ONDANSETRON 4 MG/1
4 TABLET, ORALLY DISINTEGRATING ORAL EVERY 8 HOURS PRN
Qty: 20 TABLET | Refills: 0 | Status: SHIPPED | OUTPATIENT
Start: 2023-04-18

## 2023-04-18 RX ORDER — DIPHENHYDRAMINE HYDROCHLORIDE 50 MG/ML
25 INJECTION INTRAMUSCULAR; INTRAVENOUS ONCE
Status: COMPLETED | OUTPATIENT
Start: 2023-04-18 | End: 2023-04-18

## 2023-04-18 RX ORDER — ONDANSETRON 2 MG/ML
4 INJECTION INTRAMUSCULAR; INTRAVENOUS ONCE
Status: COMPLETED | OUTPATIENT
Start: 2023-04-18 | End: 2023-04-18

## 2023-04-18 RX ORDER — CLINDAMYCIN PHOSPHATE 600 MG/50ML
600 INJECTION, SOLUTION INTRAVENOUS ONCE
Status: COMPLETED | OUTPATIENT
Start: 2023-04-18 | End: 2023-04-18

## 2023-04-18 RX ADMIN — DIPHENHYDRAMINE HYDROCHLORIDE 25 MG: 50 INJECTION, SOLUTION INTRAMUSCULAR; INTRAVENOUS at 07:27

## 2023-04-18 RX ADMIN — CLINDAMYCIN PHOSPHATE 600 MG: 600 INJECTION, SOLUTION INTRAVENOUS at 06:14

## 2023-04-18 RX ADMIN — METOCLOPRAMIDE HYDROCHLORIDE 10 MG: 5 INJECTION INTRAMUSCULAR; INTRAVENOUS at 07:27

## 2023-04-18 RX ADMIN — IOPAMIDOL 100 ML: 755 INJECTION, SOLUTION INTRAVENOUS at 08:15

## 2023-04-18 RX ADMIN — ONDANSETRON 4 MG: 2 INJECTION INTRAMUSCULAR; INTRAVENOUS at 06:14

## 2023-04-18 RX ADMIN — HYDROMORPHONE HYDROCHLORIDE 0.5 MG: 1 INJECTION, SOLUTION INTRAMUSCULAR; INTRAVENOUS; SUBCUTANEOUS at 06:15

## 2023-04-18 NOTE — ED PROVIDER NOTES
Subjective   History of Present Illness  Chief complaint: Patient is a pleasant 53-year-old who has right-sided facial pain and tooth pain.  States he woke up tonight and the pain went into his head across his head and down into his shoulders.  He has been taking some leftover antibiotics.  He has poor dentition chronically.  Who presents with elevated blood pressure.  States he is in severe pain and is nauseated.  No problems with secretions or swallowing.  Has not had fever.  He went to bed at 9 PM.  At that point time he states he does hurt in his tooth.  But now everything is become severe.  He does not have any focal weakness.  He does have general weakness.      Context:    Duration: 5 days    Timing:    Severity: Severe    Associated Symptoms:        PCP:  LMP:        Review of Systems   Constitutional: Positive for fever.   HENT: Positive for dental problem. Negative for trouble swallowing.    Eyes: Negative.    Respiratory: Negative.    Cardiovascular: Negative.    Gastrointestinal: Negative.    Neurological: Positive for headaches.       Past Medical History:   Diagnosis Date   • Arthritis    • Low back pain    • Seizure    • Stroke (cerebrum)     2018       Allergies   Allergen Reactions   • Morphine Unknown - High Severity     Unknown, pt mother told him he had a bad reaction as a child        Past Surgical History:   Procedure Laterality Date   • FINGER SURGERY      rt  little finger cut off    • HERNIA REPAIR         Family History   Problem Relation Age of Onset   • Hypertension Mother    • COPD Mother    • Diabetes Mother        Social History     Socioeconomic History   • Marital status:    Tobacco Use   • Smoking status: Every Day     Types: Cigarettes   • Smokeless tobacco: Never   • Tobacco comments:     3 CIGS A DAY   Vaping Use   • Vaping Use: Never used   Substance and Sexual Activity   • Alcohol use: Yes     Comment:  1 to 2  beers a week   • Drug use: Yes     Types: Marijuana      Comment:  has used  last use 4/13/21   • Sexual activity: Defer           Objective   Physical Exam  Vitals and nursing note reviewed.   HENT:      Mouth/Throat:        Comments: Patient with right upper gumline fullness.  No drainage.  Patient has missing teeth diffusely.  Significant gingival inflammation to the right upper jawline.  Eyes:      Extraocular Movements: Extraocular movements intact.      Pupils: Pupils are equal, round, and reactive to light.   Cardiovascular:      Rate and Rhythm: Normal rate and regular rhythm.      Pulses: Normal pulses.      Heart sounds: Normal heart sounds.   Pulmonary:      Effort: Pulmonary effort is normal.      Breath sounds: Normal breath sounds.   Abdominal:      Tenderness: There is no abdominal tenderness.   Musculoskeletal:      Cervical back: Normal range of motion and neck supple. No rigidity.   Skin:     General: Skin is warm and dry.      Coloration: Skin is not pale.   Neurological:      General: No focal deficit present.      Mental Status: He is alert and oriented to person, place, and time.   Psychiatric:         Mood and Affect: Mood normal.         Thought Content: Thought content normal.         Judgment: Judgment normal.         Procedures           ED Course  ED Course as of 04/24/23 0017   Tue Apr 18, 2023   0741 Assumed care [JW]   0817 Waiting for Cts to be read. [place] discussed with CT tech to ensure they were sent to radioloist     [JW]   0845 Radiology report a delay in reading due to computer issues [JW]   0922 Inspect reviewed last tramadol fill was April 2022 no fills since then [JW]   0912   Radiology interpretation: CTAs of the head and neck CT of the head and CT of the face reviewed independently and interpreted by me: no ich, no LVO, dental disease, sinusitis  Further interpretation by radiologist as above  Lab interpretation:  Labs all viewed by me and significant for, glucose 117 white blood cell count 9        i discussed findings with  patient who voices understanding of discharge instructions, signs and symptoms requiring return to ED; discharged improved and in stable condition with follow up for re-evaluation.  This document is intended for medical expert use only. Reading of this document by patients and/or patient's family without participating medical staff guidance may result in misinterpretation and unintended morbidity.  Any interpretation of such data is the responsibility of the patient and/or family member responsible for the patient in concert with their primary or specialist providers, not to be left for sources of online searches such as [employer], [employer] or similar queries. Relying on these approaches to knowledge may result in misinterpretation, misguided goals of care and even death should patients or family members try recommendations outside of the realm of professional medical care in a supervised inpatient environment.      [JW]   0924 Patient was initially seen by Dr. Klein with HPI ROS physical exam and initial ordering done by him.  He was noted to have sinusitis and extensive dental disease without ICH or LVO and will be discharged home with Norco Zofran and Augmentin and given instructions for follow-up with dentist as well as establishing primary care. [JW]      ED Course User Index  [JW] Rosanna Magana, SHANNAN                                           Medical Decision Making  Acute sinusitis, recurrence not specified, unspecified location: acute illness or injury  Dental disease: acute illness or injury  Nonintractable headache, unspecified chronicity pattern, unspecified headache type: acute illness or injury  Amount and/or Complexity of Data Reviewed  Labs: ordered.  Radiology: ordered.      Risk  Prescription drug management.          Final diagnoses:   None       ED Disposition  ED Disposition     None          No follow-up provider specified.       Medication List      No changes were made to your prescriptions  during this visit.

## 2023-04-18 NOTE — DISCHARGE INSTRUCTIONS
medications as directed. plenty of fluids. Tylenol as needed.  Follow up with your family doctor the [facility] next week.  Return for any new or worsening symptoms. May use warm saltwater gargles or Chloraseptic spray throat lozenges for sore throat. May use saline rinses or neti pots for congestion    Follow-up with a dentist as soon as possible.  Follow-up pharmacy precautions and warnings regarding any prescriptions.  Take antibiotic until completely finished

## 2023-04-18 NOTE — ED NOTES
Patient presents this morning with pain in his mouth that is radiating into his jaw and causing a headache. He said the pain was so bad that it woke him from sleeping. He has also been taking an old prescription for amoxicillin.

## 2024-08-15 ENCOUNTER — HOSPITAL ENCOUNTER (EMERGENCY)
Facility: HOSPITAL | Age: 54
Discharge: HOME | End: 2024-08-15
Payer: MEDICAID

## 2024-08-15 VITALS
OXYGEN SATURATION: 100 % | SYSTOLIC BLOOD PRESSURE: 127 MMHG | HEART RATE: 87 BPM | HEIGHT: 75 IN | WEIGHT: 202.82 LBS | BODY MASS INDEX: 25.22 KG/M2 | TEMPERATURE: 97 F | RESPIRATION RATE: 16 BRPM | DIASTOLIC BLOOD PRESSURE: 87 MMHG

## 2024-08-15 DIAGNOSIS — H61.21 IMPACTED CERUMEN OF RIGHT EAR: ICD-10-CM

## 2024-08-15 DIAGNOSIS — H66.93 BILATERAL OTITIS MEDIA, UNSPECIFIED OTITIS MEDIA TYPE: Primary | ICD-10-CM

## 2024-08-15 PROCEDURE — 69210 REMOVE IMPACTED EAR WAX UNI: CPT

## 2024-08-15 PROCEDURE — 99282 EMERGENCY DEPT VISIT SF MDM: CPT

## 2024-08-15 RX ORDER — AMOXICILLIN 875 MG/1
875 TABLET, COATED ORAL 2 TIMES DAILY
Qty: 20 TABLET | Refills: 0 | Status: SHIPPED | OUTPATIENT
Start: 2024-08-15 | End: 2024-08-25

## 2024-08-15 NOTE — ED PROVIDER NOTES
Subjective   History of Present Illness  Patient is a 54-year-old white male presents today with complaints of pain in both ears right worse than left as well as hearing loss mostly in the right ear.  Has been ongoing for the last few weeks.  No known injury or trauma to the ear.  No fever chills bleeding or drainage.      Review of Systems   Constitutional:  Negative for fever.   HENT:  Positive for ear pain and hearing loss. Negative for congestion.        Past Medical History:   Diagnosis Date    Arthritis     Low back pain     Seizure     Stroke (cerebrum)     2018       Allergies   Allergen Reactions    Morphine Unknown - High Severity     Unknown, pt mother told him he had a bad reaction as a child        Past Surgical History:   Procedure Laterality Date    FINGER SURGERY      rt  little finger cut off     HERNIA REPAIR         Family History   Problem Relation Age of Onset    Hypertension Mother     COPD Mother     Diabetes Mother        Social History     Socioeconomic History    Marital status:    Tobacco Use    Smoking status: Every Day     Types: Cigarettes    Smokeless tobacco: Never    Tobacco comments:     3 CIGS A DAY   Vaping Use    Vaping status: Never Used   Substance and Sexual Activity    Alcohol use: Yes     Comment:  1 to 2  beers a week    Drug use: Yes     Types: Marijuana     Comment:  has used  last use 4/13/21    Sexual activity: Defer           Objective   Physical Exam  Vital signs and triage nurse note reviewed.  Constitutional: Awake, alert; well-developed and well-nourished. No acute distress is noted.  HEENT: Normocephalic, atraumatic; pupils are PERRL with intact EOM; oropharynx is pink and moist without exudate or erythema.  No drooling or pooling of oral secretions.  Left canal appears within normal limits.  The left TM is very erythematous.  The right TM is obscured by some cerumen within the right canal.  Neck: Supple, full range of motion without pain; no cervical  lymphadenopathy. Normal phonation.  Cardiovascular: Regular rate and rhythm, normal S1-S2.  No murmur noted.  Pulmonary: Respiratory effort regular nonlabored, breath sounds clear to auscultation all fields.  Skin: Flesh tone, warm, dry, intact; no erythematous or petechial rash or lesion.    Procedures           ED Course                                             Medical Decision Making  Patient presents today with the above complaint.    He had the above exam and evaluation.  Lighted curette was used to remove cerumen from the right canal.  On reexam the right TM is very erythematous but intact.    Findings were discussed with the patient.  He will be discharged home with prescription for amoxicillin instructed follow-up with ENT if no improvement.  He was given warning signs for prompt return to the ED voiced understanding.        Final diagnoses:   Bilateral otitis media, unspecified otitis media type   Impacted cerumen of right ear       ED Disposition  ED Disposition       ED Disposition   Discharge    Condition   Stable    Comment   --               [facility]  [address]  [facility] [address] [phone]    As needed         Medication List        New Prescriptions      amoxicillin 875 MG tablet  Commonly known as: AMOXIL  Take 1 tablet by mouth 2 (Two) Times a Day for 10 days.            Stop      amoxicillin-clavulanate 875-125 MG per tablet  Commonly known as: AUGMENTIN               Where to Get Your Medications        These medications were sent to [facility] [id number] - [facility] [place] - [address] [facility] - [phone]  - [phone] FX  [address] [facility] [place] [address]      Phone: [phone]   amoxicillin 875 MG tablet            Ayesha Corbett APRN  08/15/24 [id number]

## 2024-08-15 NOTE — DISCHARGE INSTRUCTIONS
Take antibiotics as prescribed.  May alternate Tylenol and ibuprofen for pain.  Follow-up with ENT if no improvement after antibiotics are completed.  Return for new or worsening symptoms.

## 2024-08-15 NOTE — ED NOTES
This writer attempted to call pt back to triage, per PCT Kary patient was seen walking out of ED lobby towards main lobby, has not been back. Pt to be triaged when he returns to ED lobby.

## 2024-11-14 ENCOUNTER — APPOINTMENT (OUTPATIENT)
Dept: CT IMAGING | Facility: HOSPITAL | Age: 54
End: 2024-11-14
Payer: MEDICAID

## 2024-11-14 ENCOUNTER — APPOINTMENT (OUTPATIENT)
Dept: GENERAL RADIOLOGY | Facility: HOSPITAL | Age: 54
End: 2024-11-14
Payer: MEDICAID

## 2024-11-14 ENCOUNTER — HOSPITAL ENCOUNTER (OUTPATIENT)
Facility: HOSPITAL | Age: 54
Setting detail: OBSERVATION
Discharge: HOME | End: 2024-11-18
Attending: EMERGENCY MEDICINE | Admitting: EMERGENCY MEDICINE
Payer: MEDICAID

## 2024-11-14 ENCOUNTER — APPOINTMENT (OUTPATIENT)
Dept: MRI IMAGING | Facility: HOSPITAL | Age: 54
End: 2024-11-14
Payer: MEDICAID

## 2024-11-14 DIAGNOSIS — Z87.898 HISTORY OF SEIZURE: ICD-10-CM

## 2024-11-14 DIAGNOSIS — F12.90 MARIJUANA USE: ICD-10-CM

## 2024-11-14 DIAGNOSIS — R53.1 ACUTE LEFT-SIDED WEAKNESS: ICD-10-CM

## 2024-11-14 DIAGNOSIS — N39.0 ACUTE URINARY TRACT INFECTION: ICD-10-CM

## 2024-11-14 DIAGNOSIS — R56.9 SEIZURE: ICD-10-CM

## 2024-11-14 DIAGNOSIS — R55 SYNCOPE AND COLLAPSE: Primary | ICD-10-CM

## 2024-11-14 PROBLEM — R29.898: Status: ACTIVE | Noted: 2024-11-14

## 2024-11-14 PROBLEM — E83.39 HYPOPHOSPHATEMIA: Status: ACTIVE | Noted: 2024-11-14

## 2024-11-14 PROBLEM — Z72.0 TOBACCO USE: Status: ACTIVE | Noted: 2024-11-14

## 2024-11-14 PROBLEM — R41.82 AMS (ALTERED MENTAL STATUS): Status: ACTIVE | Noted: 2024-11-14

## 2024-11-14 LAB
ABO GROUP BLD: NORMAL
ALBUMIN SERPL-MCNC: 4.4 G/DL (ref 3.5–5.2)
ALBUMIN/GLOB SERPL: 1.5 G/DL
ALP SERPL-CCNC: 101 U/L (ref 39–117)
ALT SERPL W P-5'-P-CCNC: 18 U/L (ref 1–41)
AMPHET+METHAMPHET UR QL: NEGATIVE
AMPHETAMINES UR QL: NEGATIVE
ANION GAP SERPL CALCULATED.3IONS-SCNC: 9.7 MMOL/L (ref 5–15)
APTT PPP: 26.1 SECONDS (ref 22.7–35.4)
AST SERPL-CCNC: 37 U/L (ref 1–40)
BACTERIA UR QL AUTO: ABNORMAL /HPF
BARBITURATES UR QL SCN: NEGATIVE
BASOPHILS # BLD AUTO: 0.06 10*3/MM3 (ref 0–0.2)
BASOPHILS NFR BLD AUTO: 0.5 % (ref 0–1.5)
BENZODIAZ UR QL SCN: NEGATIVE
BILIRUB SERPL-MCNC: 0.7 MG/DL (ref 0–1.2)
BILIRUB UR QL STRIP: NEGATIVE
BLD GP AB SCN SERPL QL: NEGATIVE
BUN SERPL-MCNC: 12 MG/DL (ref 6–20)
BUN/CREAT SERPL: 10.8 (ref 7–25)
BUPRENORPHINE SERPL-MCNC: NEGATIVE NG/ML
CALCIUM SPEC-SCNC: 9.6 MG/DL (ref 8.6–10.5)
CANNABINOIDS SERPL QL: POSITIVE
CHLORIDE SERPL-SCNC: 101 MMOL/L (ref 98–107)
CK SERPL-CCNC: 251 U/L (ref 20–200)
CLARITY UR: ABNORMAL
CO2 SERPL-SCNC: 28.3 MMOL/L (ref 22–29)
COCAINE UR QL: NEGATIVE
COLOR UR: YELLOW
CREAT SERPL-MCNC: 1.11 MG/DL (ref 0.76–1.27)
DEPRECATED RDW RBC AUTO: 44.8 FL (ref 37–54)
EGFRCR SERPLBLD CKD-EPI 2021: 78.9 ML/MIN/1.73
EOSINOPHIL # BLD AUTO: 0.05 10*3/MM3 (ref 0–0.4)
EOSINOPHIL NFR BLD AUTO: 0.4 % (ref 0.3–6.2)
ERYTHROCYTE [DISTWIDTH] IN BLOOD BY AUTOMATED COUNT: 13.2 % (ref 12.3–15.4)
GLOBULIN UR ELPH-MCNC: 3 GM/DL
GLUCOSE BLDC GLUCOMTR-MCNC: 111 MG/DL (ref 70–105)
GLUCOSE SERPL-MCNC: 101 MG/DL (ref 65–99)
GLUCOSE UR STRIP-MCNC: NEGATIVE MG/DL
HBA1C MFR BLD: 5.55 % (ref 4.8–5.6)
HCT VFR BLD AUTO: 43 % (ref 37.5–51)
HGB BLD-MCNC: 14.1 G/DL (ref 13–17.7)
HGB UR QL STRIP.AUTO: NEGATIVE
HOLD SPECIMEN: NORMAL
HOLD SPECIMEN: NORMAL
HYALINE CASTS UR QL AUTO: ABNORMAL /LPF
IMM GRANULOCYTES # BLD AUTO: 0.03 10*3/MM3 (ref 0–0.05)
IMM GRANULOCYTES NFR BLD AUTO: 0.3 % (ref 0–0.5)
INR PPP: 0.95 (ref 0.9–1.1)
KETONES UR QL STRIP: ABNORMAL
LEUKOCYTE ESTERASE UR QL STRIP.AUTO: ABNORMAL
LYMPHOCYTES # BLD AUTO: 2.57 10*3/MM3 (ref 0.7–3.1)
LYMPHOCYTES NFR BLD AUTO: 22.4 % (ref 19.6–45.3)
MAGNESIUM SERPL-MCNC: 2.2 MG/DL (ref 1.6–2.6)
MCH RBC QN AUTO: 30.3 PG (ref 26.6–33)
MCHC RBC AUTO-ENTMCNC: 32.8 G/DL (ref 31.5–35.7)
MCV RBC AUTO: 92.3 FL (ref 79–97)
METHADONE UR QL SCN: NEGATIVE
MONOCYTES # BLD AUTO: 1.22 10*3/MM3 (ref 0.1–0.9)
MONOCYTES NFR BLD AUTO: 10.7 % (ref 5–12)
NEUTROPHILS NFR BLD AUTO: 65.7 % (ref 42.7–76)
NEUTROPHILS NFR BLD AUTO: 7.52 10*3/MM3 (ref 1.7–7)
NITRITE UR QL STRIP: POSITIVE
NRBC BLD AUTO-RTO: 0 /100 WBC (ref 0–0.2)
OPIATES UR QL: NEGATIVE
OXYCODONE UR QL SCN: NEGATIVE
PCP UR QL SCN: NEGATIVE
PH UR STRIP.AUTO: 6 [PH] (ref 5–8)
PHOSPHATE SERPL-MCNC: 1.8 MG/DL (ref 2.5–4.5)
PLATELET # BLD AUTO: 351 10*3/MM3 (ref 140–450)
PMV BLD AUTO: 11.5 FL (ref 6–12)
POTASSIUM SERPL-SCNC: 4.2 MMOL/L (ref 3.5–5.2)
PROLACTIN SERPL-MCNC: 10.9 NG/ML (ref 4.04–15.2)
PROT SERPL-MCNC: 7.4 G/DL (ref 6–8.5)
PROT UR QL STRIP: NEGATIVE
PROTHROMBIN TIME: 12.7 SECONDS (ref 11.7–14.2)
RBC # BLD AUTO: 4.66 10*6/MM3 (ref 4.14–5.8)
RBC # UR STRIP: ABNORMAL /HPF
REF LAB TEST METHOD: ABNORMAL
RH BLD: POSITIVE
SODIUM SERPL-SCNC: 139 MMOL/L (ref 136–145)
SP GR UR STRIP: 1.04 (ref 1–1.03)
SQUAMOUS #/AREA URNS HPF: ABNORMAL /HPF
T&S EXPIRATION DATE: NORMAL
TRICYCLICS UR QL SCN: NEGATIVE
TROPONIN T SERPL HS-MCNC: 7 NG/L
UROBILINOGEN UR QL STRIP: ABNORMAL
WBC # UR STRIP: ABNORMAL /HPF
WBC NRBC COR # BLD AUTO: 11.45 10*3/MM3 (ref 3.4–10.8)
WHOLE BLOOD HOLD COAG: NORMAL

## 2024-11-14 PROCEDURE — 0042T HC CT CEREBRAL PERFUSION W/WO CONTRAST: CPT

## 2024-11-14 PROCEDURE — G0378 HOSPITAL OBSERVATION PER HR: HCPCS

## 2024-11-14 PROCEDURE — 70496 CT ANGIOGRAPHY HEAD: CPT

## 2024-11-14 PROCEDURE — 84484 ASSAY OF TROPONIN QUANT: CPT | Performed by: EMERGENCY MEDICINE

## 2024-11-14 PROCEDURE — 80053 COMPREHEN METABOLIC PANEL: CPT | Performed by: EMERGENCY MEDICINE

## 2024-11-14 PROCEDURE — 86850 RBC ANTIBODY SCREEN: CPT | Performed by: EMERGENCY MEDICINE

## 2024-11-14 PROCEDURE — 25010000002 NALOXONE HCL 2 MG/2ML SOLUTION PREFILLED SYRINGE

## 2024-11-14 PROCEDURE — 86901 BLOOD TYPING SEROLOGIC RH(D): CPT | Performed by: EMERGENCY MEDICINE

## 2024-11-14 PROCEDURE — 25510000001 IOPAMIDOL PER 1 ML: Performed by: EMERGENCY MEDICINE

## 2024-11-14 PROCEDURE — 86900 BLOOD TYPING SEROLOGIC ABO: CPT | Performed by: EMERGENCY MEDICINE

## 2024-11-14 PROCEDURE — 83036 HEMOGLOBIN GLYCOSYLATED A1C: CPT | Performed by: NURSE PRACTITIONER

## 2024-11-14 PROCEDURE — 70450 CT HEAD/BRAIN W/O DYE: CPT

## 2024-11-14 PROCEDURE — 85025 COMPLETE CBC W/AUTO DIFF WBC: CPT | Performed by: EMERGENCY MEDICINE

## 2024-11-14 PROCEDURE — 36415 COLL VENOUS BLD VENIPUNCTURE: CPT

## 2024-11-14 PROCEDURE — 86901 BLOOD TYPING SEROLOGIC RH(D): CPT

## 2024-11-14 PROCEDURE — 70551 MRI BRAIN STEM W/O DYE: CPT

## 2024-11-14 PROCEDURE — 82550 ASSAY OF CK (CPK): CPT | Performed by: EMERGENCY MEDICINE

## 2024-11-14 PROCEDURE — 93005 ELECTROCARDIOGRAM TRACING: CPT | Performed by: EMERGENCY MEDICINE

## 2024-11-14 PROCEDURE — 80306 DRUG TEST PRSMV INSTRMNT: CPT | Performed by: EMERGENCY MEDICINE

## 2024-11-14 PROCEDURE — 84100 ASSAY OF PHOSPHORUS: CPT | Performed by: EMERGENCY MEDICINE

## 2024-11-14 PROCEDURE — 86900 BLOOD TYPING SEROLOGIC ABO: CPT

## 2024-11-14 PROCEDURE — 87086 URINE CULTURE/COLONY COUNT: CPT | Performed by: EMERGENCY MEDICINE

## 2024-11-14 PROCEDURE — 84146 ASSAY OF PROLACTIN: CPT | Performed by: EMERGENCY MEDICINE

## 2024-11-14 PROCEDURE — 82948 REAGENT STRIP/BLOOD GLUCOSE: CPT

## 2024-11-14 PROCEDURE — 85730 THROMBOPLASTIN TIME PARTIAL: CPT | Performed by: EMERGENCY MEDICINE

## 2024-11-14 PROCEDURE — 85610 PROTHROMBIN TIME: CPT | Performed by: EMERGENCY MEDICINE

## 2024-11-14 PROCEDURE — 71045 X-RAY EXAM CHEST 1 VIEW: CPT

## 2024-11-14 PROCEDURE — 81001 URINALYSIS AUTO W/SCOPE: CPT | Performed by: EMERGENCY MEDICINE

## 2024-11-14 PROCEDURE — 96375 TX/PRO/DX INJ NEW DRUG ADDON: CPT

## 2024-11-14 PROCEDURE — 83735 ASSAY OF MAGNESIUM: CPT | Performed by: EMERGENCY MEDICINE

## 2024-11-14 PROCEDURE — 70498 CT ANGIOGRAPHY NECK: CPT

## 2024-11-14 RX ORDER — NICOTINE 21 MG/24HR
1 PATCH, TRANSDERMAL 24 HOURS TRANSDERMAL
Status: DISCONTINUED | OUTPATIENT
Start: 2024-11-15 | End: 2024-11-18 | Stop reason: HOSPADM

## 2024-11-14 RX ORDER — ONDANSETRON 2 MG/ML
4 INJECTION INTRAMUSCULAR; INTRAVENOUS EVERY 6 HOURS PRN
Status: DISCONTINUED | OUTPATIENT
Start: 2024-11-14 | End: 2024-11-18 | Stop reason: HOSPADM

## 2024-11-14 RX ORDER — NALOXONE HYDROCHLORIDE 1 MG/ML
INJECTION INTRAMUSCULAR; INTRAVENOUS; SUBCUTANEOUS
Status: COMPLETED
Start: 2024-11-14 | End: 2024-11-14

## 2024-11-14 RX ORDER — ASPIRIN 325 MG
325 TABLET ORAL DAILY
Status: DISCONTINUED | OUTPATIENT
Start: 2024-11-14 | End: 2024-11-17

## 2024-11-14 RX ORDER — BISACODYL 10 MG
10 SUPPOSITORY, RECTAL RECTAL DAILY PRN
Status: DISCONTINUED | OUTPATIENT
Start: 2024-11-14 | End: 2024-11-18 | Stop reason: HOSPADM

## 2024-11-14 RX ORDER — SODIUM CHLORIDE 0.9 % (FLUSH) 0.9 %
10 SYRINGE (ML) INJECTION AS NEEDED
Status: DISCONTINUED | OUTPATIENT
Start: 2024-11-14 | End: 2024-11-18 | Stop reason: HOSPADM

## 2024-11-14 RX ORDER — IOPAMIDOL 755 MG/ML
100 INJECTION, SOLUTION INTRAVASCULAR
Status: COMPLETED | OUTPATIENT
Start: 2024-11-14 | End: 2024-11-14

## 2024-11-14 RX ORDER — SODIUM CHLORIDE 9 MG/ML
100 INJECTION, SOLUTION INTRAVENOUS CONTINUOUS
Status: DISCONTINUED | OUTPATIENT
Start: 2024-11-14 | End: 2024-11-14

## 2024-11-14 RX ORDER — SODIUM CHLORIDE 9 MG/ML
40 INJECTION, SOLUTION INTRAVENOUS AS NEEDED
Status: DISCONTINUED | OUTPATIENT
Start: 2024-11-14 | End: 2024-11-18 | Stop reason: HOSPADM

## 2024-11-14 RX ORDER — SODIUM CHLORIDE 9 MG/ML
100 INJECTION, SOLUTION INTRAVENOUS CONTINUOUS
Status: DISPENSED | OUTPATIENT
Start: 2024-11-14 | End: 2024-11-15

## 2024-11-14 RX ORDER — IOPAMIDOL 755 MG/ML
50 INJECTION, SOLUTION INTRAVASCULAR
Status: COMPLETED | OUTPATIENT
Start: 2024-11-14 | End: 2024-11-14

## 2024-11-14 RX ORDER — ATORVASTATIN CALCIUM 20 MG/1
80 TABLET, FILM COATED ORAL NIGHTLY
Status: DISCONTINUED | OUTPATIENT
Start: 2024-11-14 | End: 2024-11-15

## 2024-11-14 RX ORDER — SODIUM CHLORIDE 0.9 % (FLUSH) 0.9 %
10 SYRINGE (ML) INJECTION EVERY 12 HOURS SCHEDULED
Status: DISCONTINUED | OUTPATIENT
Start: 2024-11-14 | End: 2024-11-18 | Stop reason: HOSPADM

## 2024-11-14 RX ORDER — NALOXONE HYDROCHLORIDE 1 MG/ML
2 INJECTION INTRAMUSCULAR; INTRAVENOUS; SUBCUTANEOUS ONCE
Status: COMPLETED | OUTPATIENT
Start: 2024-11-14 | End: 2024-11-14

## 2024-11-14 RX ORDER — ASPIRIN 300 MG/1
300 SUPPOSITORY RECTAL DAILY
Status: DISCONTINUED | OUTPATIENT
Start: 2024-11-14 | End: 2024-11-17

## 2024-11-14 RX ADMIN — IOPAMIDOL 50 ML: 755 INJECTION, SOLUTION INTRAVENOUS at 15:27

## 2024-11-14 RX ADMIN — NALOXONE HYDROCHLORIDE 2 MG: 1 INJECTION INTRAMUSCULAR; INTRAVENOUS; SUBCUTANEOUS at 15:29

## 2024-11-14 RX ADMIN — IOPAMIDOL 100 ML: 755 INJECTION, SOLUTION INTRAVENOUS at 15:29

## 2024-11-14 NOTE — ED NOTES
This nurse explained that we were going to get a sterile urine sample with an in/out catheter.  Pt is suddenly responsive to the point that he is refusing the catheter urine and fighting nursing staff. Security called to bedside for pt being aggressive and not cooperating with care. Approximately 5 mins ago pt was completely flaccid on the left and would not follow any directions but had his eyes open and could track; movement was seen in all 4 extremities post narcan. Pt now able to answer all orientation questions correctly.

## 2024-11-14 NOTE — PLAN OF CARE
Mr. Pena presented to [facility] with his wife for mental status change and left-sided weakness that started approximately 1 hour prior to arrival.  Patient evaluated at CT scanner by both myself and Dr. Obregon.  Patient had eyes open but would not speak.  He would not consistently follow commands.  He would hold up the right arm without drift but left arm was flaccid.  He would withdrawal to pain on the left and right lower extremity initially.  No obvious facial asymmetry.  CT head was completed and negative.  Patient was given Narcan due to atypical presentation and concern for opioid overuse.  Patient then slowly began to wiggle toes and fingers.  He then was able to speak and tell us his name.  He was able to name familiar objects including pen and wrist watch.  Dr. Obregon went to talk to patient's wife at the bedside but unfortunately she was not in the room. Patient was able to stand up to get onto the stretcher.  Due to rapid improvement, recommending to hold TNK due to the risk of hemorrhage.  With the rapid improvement of Narcan, there is a concern for drug-induced altered mental status.  Recommending MRI brain without contrast.  Full consult to follow in AM.

## 2024-11-14 NOTE — Clinical Note
Level of Care: Telemetry [5]   Diagnosis: Temporary weakness of extremity [[id number]]   Admitting Physician: ANTON CONTEH [[id number]]   Attending Physician: ANTON CONTEH [[id number]]   Bed Request Comments: АННА cardiac monitor   Certification: I Certify That Inpatient Hospital Services Are Medically Necessary For Greater Than 2 Midnights

## 2024-11-15 ENCOUNTER — APPOINTMENT (OUTPATIENT)
Dept: CARDIOLOGY | Facility: HOSPITAL | Age: 54
End: 2024-11-15
Payer: MEDICAID

## 2024-11-15 ENCOUNTER — APPOINTMENT (OUTPATIENT)
Dept: NEUROLOGY | Facility: HOSPITAL | Age: 54
End: 2024-11-15
Payer: MEDICAID

## 2024-11-15 LAB
ANION GAP SERPL CALCULATED.3IONS-SCNC: 8.4 MMOL/L (ref 5–15)
ANION GAP SERPL CALCULATED.3IONS-SCNC: 9.7 MMOL/L (ref 5–15)
AORTIC DIMENSIONLESS INDEX: 0.55 (DI)
BASOPHILS # BLD AUTO: 0.05 10*3/MM3 (ref 0–0.2)
BASOPHILS # BLD AUTO: 0.06 10*3/MM3 (ref 0–0.2)
BASOPHILS NFR BLD AUTO: 0.6 % (ref 0–1.5)
BASOPHILS NFR BLD AUTO: 0.6 % (ref 0–1.5)
BH CV ECHO MEAS - ACS: 1.9 CM
BH CV ECHO MEAS - AO MAX PG: 6.1 MMHG
BH CV ECHO MEAS - AO MEAN PG: 3 MMHG
BH CV ECHO MEAS - AO V2 MAX: 123 CM/SEC
BH CV ECHO MEAS - AO V2 VTI: 26.1 CM
BH CV ECHO MEAS - AVA(I,D): 2.8 CM2
BH CV ECHO MEAS - EDV(CUBED): 79.5 ML
BH CV ECHO MEAS - EDV(MOD-SP4): 82.7 ML
BH CV ECHO MEAS - EF(MOD-SP4): 64 %
BH CV ECHO MEAS - ESV(CUBED): 39.3 ML
BH CV ECHO MEAS - ESV(MOD-SP4): 29.8 ML
BH CV ECHO MEAS - FS: 20.9 %
BH CV ECHO MEAS - IVS/LVPW: 0.73 CM
BH CV ECHO MEAS - IVSD: 0.8 CM
BH CV ECHO MEAS - LA DIMENSION: 3.1 CM
BH CV ECHO MEAS - LAT PEAK E' VEL: 12 CM/SEC
BH CV ECHO MEAS - LV MASS(C)D: 132.7 GRAMS
BH CV ECHO MEAS - LV MAX PG: 1.84 MMHG
BH CV ECHO MEAS - LV MEAN PG: 1 MMHG
BH CV ECHO MEAS - LV V1 MAX: 67.9 CM/SEC
BH CV ECHO MEAS - LV V1 VTI: 16.3 CM
BH CV ECHO MEAS - LVIDD: 4.3 CM
BH CV ECHO MEAS - LVIDS: 3.4 CM
BH CV ECHO MEAS - LVOT AREA: 4.5 CM2
BH CV ECHO MEAS - LVOT DIAM: 2.4 CM
BH CV ECHO MEAS - LVPWD: 1.1 CM
BH CV ECHO MEAS - MED PEAK E' VEL: 11.5 CM/SEC
BH CV ECHO MEAS - MV A MAX VEL: 70.7 CM/SEC
BH CV ECHO MEAS - MV DEC SLOPE: 218.5 CM/SEC2
BH CV ECHO MEAS - MV DEC TIME: 0.26 SEC
BH CV ECHO MEAS - MV E MAX VEL: 60.8 CM/SEC
BH CV ECHO MEAS - MV E/A: 0.86
BH CV ECHO MEAS - MV MAX PG: 2.23 MMHG
BH CV ECHO MEAS - MV MEAN PG: 1 MMHG
BH CV ECHO MEAS - MV P1/2T: 105.5 MSEC
BH CV ECHO MEAS - MV V2 VTI: 26.8 CM
BH CV ECHO MEAS - MVA(P1/2T): 2.09 CM2
BH CV ECHO MEAS - MVA(VTI): 2.8 CM2
BH CV ECHO MEAS - PA V2 MAX: 81.4 CM/SEC
BH CV ECHO MEAS - PULM A REVS DUR: 0.11 SEC
BH CV ECHO MEAS - PULM A REVS VEL: 36.5 CM/SEC
BH CV ECHO MEAS - PULM DIAS VEL: 51.1 CM/SEC
BH CV ECHO MEAS - PULM S/D: 0.96
BH CV ECHO MEAS - PULM SYS VEL: 49.3 CM/SEC
BH CV ECHO MEAS - QP/QS: 0.35
BH CV ECHO MEAS - RV MAX PG: 1.4 MMHG
BH CV ECHO MEAS - RV V1 MAX: 59.1 CM/SEC
BH CV ECHO MEAS - RV V1 VTI: 14.4 CM
BH CV ECHO MEAS - RVDD: 3.1 CM
BH CV ECHO MEAS - RVOT DIAM: 1.5 CM
BH CV ECHO MEAS - SV(LVOT): 73.7 ML
BH CV ECHO MEAS - SV(MOD-SP4): 52.9 ML
BH CV ECHO MEAS - SV(RVOT): 25.4 ML
BH CV ECHO MEAS - TAPSE (>1.6): 3.2 CM
BH CV ECHO MEASUREMENTS AVERAGE E/E' RATIO: 5.17
BH CV XLRA - TDI S': 12.6 CM/SEC
BUN SERPL-MCNC: 11 MG/DL (ref 6–20)
BUN SERPL-MCNC: 12 MG/DL (ref 6–20)
BUN/CREAT SERPL: 11.5 (ref 7–25)
BUN/CREAT SERPL: 9.5 (ref 7–25)
CALCIUM SPEC-SCNC: 9 MG/DL (ref 8.6–10.5)
CALCIUM SPEC-SCNC: 9 MG/DL (ref 8.6–10.5)
CHLORIDE SERPL-SCNC: 103 MMOL/L (ref 98–107)
CHLORIDE SERPL-SCNC: 105 MMOL/L (ref 98–107)
CHOLEST SERPL-MCNC: 166 MG/DL (ref 0–200)
CO2 SERPL-SCNC: 25.3 MMOL/L (ref 22–29)
CO2 SERPL-SCNC: 26.6 MMOL/L (ref 22–29)
CREAT SERPL-MCNC: 1.04 MG/DL (ref 0.76–1.27)
CREAT SERPL-MCNC: 1.16 MG/DL (ref 0.76–1.27)
DEPRECATED RDW RBC AUTO: 44.5 FL (ref 37–54)
DEPRECATED RDW RBC AUTO: 45.1 FL (ref 37–54)
EGFRCR SERPLBLD CKD-EPI 2021: 74.8 ML/MIN/1.73
EGFRCR SERPLBLD CKD-EPI 2021: 85.3 ML/MIN/1.73
EOSINOPHIL # BLD AUTO: 0.13 10*3/MM3 (ref 0–0.4)
EOSINOPHIL # BLD AUTO: 0.17 10*3/MM3 (ref 0–0.4)
EOSINOPHIL NFR BLD AUTO: 1.4 % (ref 0.3–6.2)
EOSINOPHIL NFR BLD AUTO: 1.7 % (ref 0.3–6.2)
ERYTHROCYTE [DISTWIDTH] IN BLOOD BY AUTOMATED COUNT: 13.2 % (ref 12.3–15.4)
ERYTHROCYTE [DISTWIDTH] IN BLOOD BY AUTOMATED COUNT: 13.2 % (ref 12.3–15.4)
ETHANOL UR QL: <0.01 %
GLUCOSE BLDC GLUCOMTR-MCNC: 143 MG/DL (ref 70–105)
GLUCOSE BLDC GLUCOMTR-MCNC: 90 MG/DL (ref 70–105)
GLUCOSE SERPL-MCNC: 110 MG/DL (ref 65–99)
GLUCOSE SERPL-MCNC: 96 MG/DL (ref 65–99)
HCT VFR BLD AUTO: 40.1 % (ref 37.5–51)
HCT VFR BLD AUTO: 40.5 % (ref 37.5–51)
HDLC SERPL-MCNC: 42 MG/DL (ref 40–60)
HGB BLD-MCNC: 13.3 G/DL (ref 13–17.7)
HGB BLD-MCNC: 13.5 G/DL (ref 13–17.7)
IMM GRANULOCYTES # BLD AUTO: 0.03 10*3/MM3 (ref 0–0.05)
IMM GRANULOCYTES # BLD AUTO: 0.03 10*3/MM3 (ref 0–0.05)
IMM GRANULOCYTES NFR BLD AUTO: 0.3 % (ref 0–0.5)
IMM GRANULOCYTES NFR BLD AUTO: 0.3 % (ref 0–0.5)
LDLC SERPL CALC-MCNC: 108 MG/DL (ref 0–100)
LDLC/HDLC SERPL: 2.56 {RATIO}
LV EF 2D ECHO EST: 60 %
LYMPHOCYTES # BLD AUTO: 2.9 10*3/MM3 (ref 0.7–3.1)
LYMPHOCYTES # BLD AUTO: 3.07 10*3/MM3 (ref 0.7–3.1)
LYMPHOCYTES NFR BLD AUTO: 31 % (ref 19.6–45.3)
LYMPHOCYTES NFR BLD AUTO: 32.2 % (ref 19.6–45.3)
MCH RBC QN AUTO: 30.3 PG (ref 26.6–33)
MCH RBC QN AUTO: 31 PG (ref 26.6–33)
MCHC RBC AUTO-ENTMCNC: 32.8 G/DL (ref 31.5–35.7)
MCHC RBC AUTO-ENTMCNC: 33.7 G/DL (ref 31.5–35.7)
MCV RBC AUTO: 92 FL (ref 79–97)
MCV RBC AUTO: 92.3 FL (ref 79–97)
MONOCYTES # BLD AUTO: 1.26 10*3/MM3 (ref 0.1–0.9)
MONOCYTES # BLD AUTO: 1.38 10*3/MM3 (ref 0.1–0.9)
MONOCYTES NFR BLD AUTO: 14 % (ref 5–12)
MONOCYTES NFR BLD AUTO: 14 % (ref 5–12)
NEUTROPHILS NFR BLD AUTO: 4.65 10*3/MM3 (ref 1.7–7)
NEUTROPHILS NFR BLD AUTO: 5.18 10*3/MM3 (ref 1.7–7)
NEUTROPHILS NFR BLD AUTO: 51.5 % (ref 42.7–76)
NEUTROPHILS NFR BLD AUTO: 52.4 % (ref 42.7–76)
NRBC BLD AUTO-RTO: 0 /100 WBC (ref 0–0.2)
NRBC BLD AUTO-RTO: 0 /100 WBC (ref 0–0.2)
PLATELET # BLD AUTO: 347 10*3/MM3 (ref 140–450)
PLATELET # BLD AUTO: 398 10*3/MM3 (ref 140–450)
PMV BLD AUTO: 10.8 FL (ref 6–12)
PMV BLD AUTO: 9.9 FL (ref 6–12)
POTASSIUM SERPL-SCNC: 3.7 MMOL/L (ref 3.5–5.2)
POTASSIUM SERPL-SCNC: 4 MMOL/L (ref 3.5–5.2)
QT INTERVAL: 381 MS
QTC INTERVAL: 403 MS
RBC # BLD AUTO: 4.36 10*6/MM3 (ref 4.14–5.8)
RBC # BLD AUTO: 4.39 10*6/MM3 (ref 4.14–5.8)
SINUS: 3.7 CM
SODIUM SERPL-SCNC: 138 MMOL/L (ref 136–145)
SODIUM SERPL-SCNC: 140 MMOL/L (ref 136–145)
TRIGL SERPL-MCNC: 83 MG/DL (ref 0–150)
TSH SERPL DL<=0.05 MIU/L-ACNC: 1.23 UIU/ML (ref 0.27–4.2)
VLDLC SERPL-MCNC: 16 MG/DL (ref 5–40)
WBC NRBC COR # BLD AUTO: 9.02 10*3/MM3 (ref 3.4–10.8)
WBC NRBC COR # BLD AUTO: 9.89 10*3/MM3 (ref 3.4–10.8)

## 2024-11-15 PROCEDURE — 80061 LIPID PANEL: CPT | Performed by: NURSE PRACTITIONER

## 2024-11-15 PROCEDURE — G0378 HOSPITAL OBSERVATION PER HR: HCPCS

## 2024-11-15 PROCEDURE — 80048 BASIC METABOLIC PNL TOTAL CA: CPT | Performed by: NURSE PRACTITIONER

## 2024-11-15 PROCEDURE — 99214 OFFICE O/P EST MOD 30 MIN: CPT | Performed by: NURSE PRACTITIONER

## 2024-11-15 PROCEDURE — 25810000003 SODIUM CHLORIDE 0.9 % SOLUTION: Performed by: EMERGENCY MEDICINE

## 2024-11-15 PROCEDURE — 25010000002 PROCHLORPERAZINE 10 MG/2ML SOLUTION: Performed by: NURSE PRACTITIONER

## 2024-11-15 PROCEDURE — 82948 REAGENT STRIP/BLOOD GLUCOSE: CPT

## 2024-11-15 PROCEDURE — 85025 COMPLETE CBC W/AUTO DIFF WBC: CPT | Performed by: NURSE PRACTITIONER

## 2024-11-15 PROCEDURE — 93306 TTE W/DOPPLER COMPLETE: CPT

## 2024-11-15 PROCEDURE — 99285 EMERGENCY DEPT VISIT HI MDM: CPT

## 2024-11-15 PROCEDURE — 96375 TX/PRO/DX INJ NEW DRUG ADDON: CPT

## 2024-11-15 PROCEDURE — 84443 ASSAY THYROID STIM HORMONE: CPT | Performed by: NURSE PRACTITIONER

## 2024-11-15 PROCEDURE — 95819 EEG AWAKE AND ASLEEP: CPT

## 2024-11-15 PROCEDURE — 96361 HYDRATE IV INFUSION ADD-ON: CPT

## 2024-11-15 PROCEDURE — 25010000002 KETOROLAC TROMETHAMINE PER 15 MG: Performed by: NURSE PRACTITIONER

## 2024-11-15 PROCEDURE — 82077 ASSAY SPEC XCP UR&BREATH IA: CPT | Performed by: NURSE PRACTITIONER

## 2024-11-15 PROCEDURE — 93306 TTE W/DOPPLER COMPLETE: CPT | Performed by: INTERNAL MEDICINE

## 2024-11-15 PROCEDURE — 25010000002 CEFTRIAXONE PER 250 MG: Performed by: EMERGENCY MEDICINE

## 2024-11-15 PROCEDURE — 25010000002 CEFTRIAXONE PER 250 MG: Performed by: INTERNAL MEDICINE

## 2024-11-15 RX ORDER — KETOROLAC TROMETHAMINE 30 MG/ML
30 INJECTION, SOLUTION INTRAMUSCULAR; INTRAVENOUS ONCE
Status: COMPLETED | OUTPATIENT
Start: 2024-11-15 | End: 2024-11-15

## 2024-11-15 RX ORDER — CARBAMAZEPINE 200 MG/1
200 TABLET ORAL 3 TIMES DAILY
Status: DISCONTINUED | OUTPATIENT
Start: 2024-11-22 | End: 2024-11-18 | Stop reason: HOSPADM

## 2024-11-15 RX ORDER — CARBAMAZEPINE 200 MG/1
300 TABLET ORAL 2 TIMES DAILY
Status: DISCONTINUED | OUTPATIENT
Start: 2024-11-29 | End: 2024-11-18 | Stop reason: HOSPADM

## 2024-11-15 RX ORDER — ESCITALOPRAM OXALATE 10 MG/1
10 TABLET ORAL DAILY
Status: DISCONTINUED | OUTPATIENT
Start: 2024-11-15 | End: 2024-11-18 | Stop reason: HOSPADM

## 2024-11-15 RX ORDER — PROCHLORPERAZINE EDISYLATE 5 MG/ML
10 INJECTION INTRAMUSCULAR; INTRAVENOUS ONCE
Status: COMPLETED | OUTPATIENT
Start: 2024-11-15 | End: 2024-11-15

## 2024-11-15 RX ORDER — CARBAMAZEPINE 200 MG/1
300 TABLET ORAL 3 TIMES DAILY
Status: DISCONTINUED | OUTPATIENT
Start: 2024-12-06 | End: 2024-11-18 | Stop reason: HOSPADM

## 2024-11-15 RX ORDER — ATORVASTATIN CALCIUM 20 MG/1
20 TABLET, FILM COATED ORAL NIGHTLY
Status: DISCONTINUED | OUTPATIENT
Start: 2024-11-15 | End: 2024-11-18 | Stop reason: HOSPADM

## 2024-11-15 RX ORDER — CARBAMAZEPINE 200 MG/1
200 TABLET ORAL EVERY 24 HOURS
Status: DISCONTINUED | OUTPATIENT
Start: 2024-11-29 | End: 2024-11-18 | Stop reason: HOSPADM

## 2024-11-15 RX ORDER — CARBAMAZEPINE 200 MG/1
200 TABLET ORAL 2 TIMES DAILY
Status: DISCONTINUED | OUTPATIENT
Start: 2024-11-15 | End: 2024-11-18 | Stop reason: HOSPADM

## 2024-11-15 RX ORDER — CITALOPRAM HYDROBROMIDE 20 MG/1
20 TABLET ORAL DAILY
Status: CANCELLED | OUTPATIENT
Start: 2024-11-15

## 2024-11-15 RX ADMIN — CEFTRIAXONE 2000 MG: 2 INJECTION, POWDER, FOR SOLUTION INTRAMUSCULAR; INTRAVENOUS at 21:05

## 2024-11-15 RX ADMIN — KETOROLAC TROMETHAMINE 30 MG: 30 INJECTION, SOLUTION INTRAMUSCULAR at 22:26

## 2024-11-15 RX ADMIN — ASPIRIN 325 MG ORAL TABLET 325 MG: 325 PILL ORAL at 00:30

## 2024-11-15 RX ADMIN — SODIUM CHLORIDE 100 ML/HR: 9 INJECTION, SOLUTION INTRAVENOUS at 00:32

## 2024-11-15 RX ADMIN — ATORVASTATIN CALCIUM 20 MG: 20 TABLET, FILM COATED ORAL at 21:05

## 2024-11-15 RX ADMIN — PROCHLORPERAZINE EDISYLATE 10 MG: 5 INJECTION INTRAMUSCULAR; INTRAVENOUS at 22:26

## 2024-11-15 RX ADMIN — Medication 2 PACKET: at 17:09

## 2024-11-15 RX ADMIN — ESCITALOPRAM OXALATE 10 MG: 10 TABLET ORAL at 11:58

## 2024-11-15 RX ADMIN — CARBAMAZEPINE 200 MG: 200 TABLET ORAL at 21:05

## 2024-11-15 RX ADMIN — NICOTINE 1 PATCH: 21 PATCH TRANSDERMAL at 09:07

## 2024-11-15 RX ADMIN — ATORVASTATIN CALCIUM 80 MG: 20 TABLET, FILM COATED ORAL at 00:30

## 2024-11-15 RX ADMIN — SODIUM CHLORIDE 100 ML/HR: 9 INJECTION, SOLUTION INTRAVENOUS at 10:30

## 2024-11-15 RX ADMIN — CARBAMAZEPINE 200 MG: 200 TABLET ORAL at 11:57

## 2024-11-15 RX ADMIN — Medication 10 ML: at 21:06

## 2024-11-15 RX ADMIN — CEFTRIAXONE 2000 MG: 2 INJECTION, POWDER, FOR SOLUTION INTRAMUSCULAR; INTRAVENOUS at 00:31

## 2024-11-15 NOTE — PLAN OF CARE
Problem: Adult Inpatient Plan of Care  Goal: Plan of Care Review  Outcome: Progressing  Goal: Patient-Specific Goal (Individualized)  Outcome: Progressing  Goal: Absence of Hospital-Acquired Illness or Injury  Outcome: Progressing  Goal: Optimal Comfort and Wellbeing  Outcome: Progressing  Goal: Readiness for Transition of Care  Outcome: Progressing   Goal Outcome Evaluation:

## 2024-11-15 NOTE — PROGRESS NOTES
[facility] MEDICINE SERVICE  DAILY PROGRESS NOTE    NAME: Juaquin Pena  : 1970  MRN: [id number]      LOS: 0 days     PROVIDER OF SERVICE: Marguerite Dougherty MD    Chief Complaint: Temporary weakness of extremity    Subjective:     Interval History:  History taken from: patient    No new complaint        Review of Systems:   Review of Systems   All other systems reviewed and are negative.      Objective:     Vital Signs  Temp:  [97.6 °F (36.4 °C)-98.6 °F (37 °C)] 98.1 °F (36.7 °C)  Heart Rate:  [58-92] 62  Resp:  [10-16] 16  BP: (115-169)/(63-97) 123/65   Body mass index is 25 kg/m².    Physical Exam  Physical Exam  Constitutional:       Appearance: Normal appearance.   HENT:      Head: Normocephalic and atraumatic.      Nose: Nose normal.      Mouth/Throat:      Mouth: Mucous membranes are moist.   Eyes:      Extraocular Movements: Extraocular movements intact.      Conjunctiva/sclera: Conjunctivae normal.      Pupils: Pupils are equal, round, and reactive to light.   Cardiovascular:      Rate and Rhythm: Normal rate and regular rhythm.      Pulses: Normal pulses.      Heart sounds: Normal heart sounds.   Pulmonary:      Effort: Pulmonary effort is normal.      Breath sounds: Normal breath sounds.   Abdominal:      General: Abdomen is flat. Bowel sounds are normal.      Palpations: Abdomen is soft.   Musculoskeletal:         General: Normal range of motion.      Cervical back: Normal range of motion and neck supple.   Skin:     General: Skin is warm and dry.      Capillary Refill: Capillary refill takes less than 2 seconds.   Neurological:      General: No focal deficit present.      Mental Status: He is alert and oriented to person, place, and time.   Psychiatric:         Mood and Affect: Mood normal.         Behavior: Behavior normal.         Thought Content: Thought content normal.         Judgment: Judgment normal.         Current Medications:  Scheduled Meds:aspirin, 325 mg, Oral, Daily    Or  aspirin, 300 mg, Rectal, Daily  atorvastatin, 80 mg, Oral, Nightly  carBAMazepine, 200 mg, Oral, BID  [START ON 11/22/2024] carBAMazepine, 200 mg, Oral, TID  [START ON 11/29/2024] carBAMazepine, 300 mg, Oral, BID   And  [START ON 11/29/2024] carBAMazepine, 200 mg, Oral, Q24H  [START ON 12/6/2024] carBAMazepine, 300 mg, Oral, TID  cefTRIAXone, 2,000 mg, Intravenous, Q24H  escitalopram, 10 mg, Oral, Daily  nicotine, 1 patch, Transdermal, Q24H  sodium chloride, 10 mL, Intravenous, Q12H      Continuous Infusions:   PRN Meds:.  bisacodyl    Calcium Replacement - Follow Nurse / BPA Driven Protocol    Magnesium Standard Dose Replacement - Follow Nurse / BPA Driven Protocol    ondansetron    Phosphorus Replacement - Follow Nurse / BPA Driven Protocol    Potassium Replacement - Follow Nurse / BPA Driven Protocol    sodium chloride    sodium chloride    sodium chloride       Diagnostic Data    Results from last 7 days   Lab Units 11/15/24  0103 11/14/24  1538   WBC 10*3/mm3 9.02 11.45*   HEMOGLOBIN g/dL 13.5 14.1   HEMATOCRIT % 40.1 43.0   PLATELETS 10*3/mm3 347 351   GLUCOSE mg/dL 110* 101*   CREATININE mg/dL 1.04 1.11   BUN mg/dL 12 12   SODIUM mmol/L 138 139   POTASSIUM mmol/L 3.7 4.2   AST (SGOT) U/L  --  37   ALT (SGPT) U/L  --  18   ALK PHOS U/L  --  101   BILIRUBIN mg/dL  --  0.7   ANION GAP mmol/L 8.4 9.7       MRI Brain Without Contrast    Result Date: 11/14/2024  Impression: No acute intracranial abnormality. Scattered periventricular and subcortical T2/FLAIR hyperintensities are nonspecific but most likely represents chronic small vessel ischemic changes. Electronically Signed: Adama Winters DO  11/14/2024 10:15 PM EST  Workstation ID: [id number]    CT Angiogram Head w AI Analysis of LVO    Result Date: 11/14/2024  1.No acute abnormality is identified within the large arteries of the head or neck. 2.No significant stenosis of the bilateral internal carotid arteries. Electronically Signed: Seth Kapoor MD   11/14/2024 4:24 PM EST  Workstation ID: [id number]    CT Angiogram Neck    Result Date: 11/14/2024  1.No acute abnormality is identified within the large arteries of the head or neck. 2.No significant stenosis of the bilateral internal carotid arteries. Electronically Signed: Seth Kapoor MD  11/14/2024 4:24 PM EST  Workstation ID: [id number]    XR Chest 1 View    Result Date: 11/14/2024  Impression: No acute chest finding Electronically Signed: Nereida Erwin MD  11/14/2024 4:21 PM EST  Workstation ID: [id number]    CT CEREBRAL PERFUSION WITH & WITHOUT CONTRAST    Result Date: 11/14/2024  Impression: Normal, negative CT cerebral perfusion study. Electronically Signed: Nereida Erwin MD  11/14/2024 3:32 PM EST  Workstation ID: [id number]    CT Head Without Contrast Stroke Protocol    Result Date: 11/14/2024  Impression: No acute intracranial finding. Electronically Signed: Charly Starkey  11/14/2024 3:20 PM EST  Workstation ID: [id number]       I reviewed the patient's new clinical results.    Assessment/Plan:     Active and Resolved Problems  Active Hospital Problems    Diagnosis  POA    **Temporary weakness of extremity [R29.898]  Yes    AMS (altered mental status) [R41.82]  Yes    Hypophosphatemia [E83.39]  Yes    Marijuana use [F12.90]  Yes    Acute UTI (urinary tract infection) [N39.0]  Yes    Tobacco use [Z72.0]  Yes      Resolved Hospital Problems   No resolved problems to display.       Temporary left-sided weakness and altered mental status   -MRI brain showed no acute intracranial process.  Altered mental status is completely resolved.  EEG to be done this afternoon.     Hypophosphatemia   - Replace phosphorus     Acute UTI   -Continue ceftriaxone follow-up with urine culture results    VTE Prophylaxis:  Mechanical VTE prophylaxis orders are present.             Disposition Planning:     Barriers to Discharge: Pending clinical improvement  Anticipated Date of Discharge: Pending clinical course  Place of  Discharge: Home          Code Status and Medical Interventions: CPR (Attempt to Resuscitate); Full Support   Ordered at: 11/14/24 2124     Code Status (Patient has no pulse and is not breathing):    CPR (Attempt to Resuscitate)     Medical Interventions (Patient has pulse or is breathing):    Full Support       Signature: Electronically signed by Marguerite Dougherty MD, 11/15/24, 13:10 EST.  [facility]

## 2024-11-15 NOTE — NURSING NOTE
Nursing report ED to floor  Juaquin Pena  54 y.o.  male    HPI:   Chief Complaint   Patient presents with    Extremity Weakness     Left sided weakness       Admitting doctor:   Sarah Barakat DO    Admitting diagnosis:   The primary encounter diagnosis was Syncope and collapse. Diagnoses of Acute left-sided weakness, History of seizure, Acute urinary tract infection, and Marijuana use were also pertinent to this visit.    Code status:   Current Code Status       Date Active Code Status Order ID Comments User Context       11/14/2024 2124 CPR (Attempt to Resuscitate) [id number]  Tena Marie APRN ED        Question Answer    Code Status (Patient has no pulse and is not breathing) CPR (Attempt to Resuscitate)    Medical Interventions (Patient has pulse or is breathing) Full Support                    Allergies:   Morphine    Isolation:  No active isolations     Fall Risk:  Fall Risk Assessment was completed, and patient is at low risk for falls.   Predictive Model Details         15 (Low) Factor Value    Calculated 11/15/2024 12:43 Age 54    Risk of Fall Model Number of Distinct Medication Classes administered 9     Active Peripheral IV Present     Imaging order in this encounter Present     Number of administrations of Misc Psychotherapeutics 1     Magnesium 2.2 mg/dL     Diastolic BP 65     Number of administrations of Anti-Convulsants 1     Drug Use Current     Tobacco Use Current     Number of administrations of Antidepressants 1     Days after Admission 0.907     Calcium 9 mg/dL     Clinically Relevant Sex Not Female     Total Bilirubin 0.7 mg/dL     Eladio Scale 22     Respiratory Rate 16     Chloride 103 mmol/L     Albumin 4.4 g/dL     Creatinine 1.04 mg/dL     ALT 18 U/L     Potassium 3.7 mmol/L         Weight:       11/15/24  0654   Weight: 90.7 kg (200 lb)       Intake and Output    Intake/Output Summary (Last 24 hours) at 11/15/2024 1245  Last data filed at 11/15/2024 1030  Gross per 24 hour  "  Intake 996.67 ml   Output 300 ml   Net 696.67 ml       Diet:   Dietary Orders (From admission, onward)       Start     Ordered    11/15/24 0837  Diet: Cardiac; Healthy Heart (2-3 Na+); Fluid Consistency: Thin (IDDSI 0)  Diet Effective Now        References:    Diet Order [facility]   Question Answer Comment   Diets: Cardiac    Cardiac Diet: Healthy Heart (2-3 Na+)    Fluid Consistency: Thin (IDDSI 0)        11/15/24 0836                     Most recent vitals:   Vitals:    11/15/24 0441 11/15/24 0654 11/15/24 0909 11/15/24 1100   BP: 119/86 119/86 116/63 123/65   BP Location: Left arm  Left arm Left arm   Patient Position: Lying  Lying Lying   Pulse: 71  58 62   Resp: 13  16 16   Temp: 97.6 °F (36.4 °C)  97.7 °F (36.5 °C) 98.1 °F (36.7 °C)   TempSrc: Axillary  Oral Oral   SpO2: 99%  98% 96%   Weight:  90.7 kg (200 lb)     Height:  190.5 cm (75\")         Active LDAs/IV Access:   Lines, Drains & Airways       Active LDAs       Name Placement date Placement time Site Days    Peripheral IV Right Antecubital --  --  Antecubital  --                    Skin Condition:   Skin Assessments (last day)       Date/Time Skin WDL Skin Color/Characteristics Skin Temperature Skin Elasticity Skin Integrity Sensory Perception Moisture Activity Mobility Nutrition Friction and Shear Eladio Score Interval    11/14/24 1503 -- -- -- -- -- -- -- -- -- -- -- -- baseline    11/14/24 15:53:04 -- -- -- -- -- -- -- -- -- -- -- -- baseline    11/14/24 2300 WDL;color;temperature;elasticity;integrity without discoloration warm quick return to original state intact -- -- -- -- -- -- -- --    11/14/24 2323 -- -- -- -- -- 4-->no impairment 4-->rarely moist 4-->walks frequently 3-->slightly limited 4-->excellent 3-->no apparent problem 22 --    11/15/24 0100 -- -- -- -- -- -- -- -- -- -- -- -- baseline    11/15/24 0753 WDL;color;temperature;elasticity;integrity without discoloration warm quick return to original state intact 4-->no impairment " 4-->rarely moist 4-->walks frequently 3-->slightly limited 4-->excellent 3-->no apparent problem 22 --    11/15/24 1241 WDL;color;temperature;elasticity;integrity without discoloration warm quick return to original state intact 4-->no impairment 4-->rarely moist 4-->walks frequently 3-->slightly limited 4-->excellent 3-->no apparent problem 22 --             Labs (abnormal labs have a star):   Labs Reviewed   COMPREHENSIVE METABOLIC PANEL - Abnormal; Notable for the following components:       Result Value    Glucose 101 (*)     All other components within normal limits    Narrative:     GFR Normal >60  Chronic Kidney Disease <60  Kidney Failure <15     CK - Abnormal; Notable for the following components:    Creatine Kinase 251 (*)     All other components within normal limits   PHOSPHORUS - Abnormal; Notable for the following components:    Phosphorus 1.8 (*)     All other components within normal limits   CBC WITH AUTO DIFFERENTIAL - Abnormal; Notable for the following components:    WBC 11.45 (*)     Neutrophils, Absolute 7.52 (*)     Monocytes, Absolute 1.22 (*)     All other components within normal limits   URINE DRUG SCREEN - Abnormal; Notable for the following components:    THC, Screen, Urine Positive (*)     All other components within normal limits    Narrative:     Cutoff For Drugs Screened:    Amphetamines               500 ng/ml  Barbiturates               200 ng/ml  Benzodiazepines            150 ng/ml  Cocaine                    150 ng/ml  Methadone                  200 ng/ml  Opiates                    100 ng/ml  Phencyclidine               25 ng/ml  THC                         50 ng/ml  Methamphetamine            500 ng/ml  Tricyclic Antidepressants  300 ng/ml  Oxycodone                  100 ng/ml  Buprenorphine               10 ng/ml    The normal value for all drugs tested is negative. This report includes unconfirmed screening results, with the cutoff values listed, to be used for medical  treatment purposes only.  Unconfirmed results must not be used for non-medical purposes such as employment or legal testing.  Clinical consideration should be applied to any drug of abuse test, particularly when unconfirmed results are used.    All urine drugs of abuse requests without chain of custody are for medical screening purposes only.  False positives are possible.     URINALYSIS W/ CULTURE IF INDICATED - Abnormal; Notable for the following components:    Appearance, UA Cloudy (*)     Specific Gravity, UA 1.037 (*)     Ketones, UA Trace (*)     Leuk Esterase, UA Small (1+) (*)     Nitrite, UA Positive (*)     All other components within normal limits    Narrative:     In absence of clinical symptoms, the presence of pyuria, bacteria, and/or nitrites on the urinalysis result does not correlate with infection.   URINALYSIS, MICROSCOPIC ONLY - Abnormal; Notable for the following components:    WBC, UA 21-50 (*)     Bacteria, UA 4+ (*)     All other components within normal limits   LIPID PANEL - Abnormal; Notable for the following components:    LDL Cholesterol  108 (*)     All other components within normal limits    Narrative:     Cholesterol Reference Ranges  (U.S. Department of Health and Human Services ATP III Classifications)    Desirable          <200 mg/dL  Borderline High    200-239 mg/dL  High Risk          >240 mg/dL      Triglyceride Reference Ranges  (U.S. Department of Health and Human Services ATP III Classifications)    Normal           <150 mg/dL  Borderline High  150-199 mg/dL  High             200-499 mg/dL  Very High        >500 mg/dL    HDL Reference Ranges  (U.S. Department of Health and Human Services ATP III Classifications)    Low     <40 mg/dl (major risk factor for CHD)  High    >60 mg/dl ('negative' risk factor for CHD)        LDL Reference Ranges  (U.S. Department of Health and Human Services ATP III Classifications)    Optimal          <100 mg/dL  Near Optimal     100-129  mg/dL  Borderline High  130-159 mg/dL  High             160-189 mg/dL  Very High        >189 mg/dL   BASIC METABOLIC PANEL - Abnormal; Notable for the following components:    Glucose 110 (*)     All other components within normal limits    Narrative:     GFR Normal >60  Chronic Kidney Disease <60  Kidney Failure <15     CBC WITH AUTO DIFFERENTIAL - Abnormal; Notable for the following components:    Monocyte % 14.0 (*)     Monocytes, Absolute 1.26 (*)     All other components within normal limits   POCT GLUCOSE FINGERSTICK - Abnormal; Notable for the following components:    Glucose 111 (*)     All other components within normal limits   URINE CULTURE - Normal   PROTIME-INR - Normal   APTT - Normal   SINGLE HS TROPONIN T - Normal    Narrative:     High Sensitive Troponin T Reference Range:  <14.0 ng/L- Negative Female for AMI  <22.0 ng/L- Negative Male for AMI  >=14 - Abnormal Female indicating possible myocardial injury.  >=22 - Abnormal Male indicating possible myocardial injury.   Clinicians would have to utilize clinical acumen, EKG, Troponin, and serial changes to determine if it is an Acute Myocardial Infarction or myocardial injury due to an underlying chronic condition.        MAGNESIUM - Normal   PROLACTIN - Normal    Narrative:     Results may be falsely decreased if patient taking Biotin.    HEMOGLOBIN A1C - Normal   TSH - Normal   RAINBOW DRAW    Narrative:     The following orders were created for panel order [facility] Draw.  Procedure                               Abnormality         Status                     ---------                               -----------         ------                     Green Top (Gel)[[id number]]                                  Final result               Lavender Top[[id number]]                                                                Gold Top - SST[[id number]]                                   Final result               Light Blue Top[[id number]]                                    Final result                 Please view results for these tests on the individual orders.   ETHANOL    Narrative:     Plasma Ethanol Clinical Symptoms:    ETOH (%)               Clinical Symptom  .01-.05              No apparent influence  .03-.12              Euphoria, Diminished judgment and attention   .09-.25              Impaired comprehension, Muscle incoordination  .18-.30              Confusion, Staggered gait, Slurred speech  .25-.40              Markedly decreased response to stimuli, unable to stand or                        walk, vomitting, sleep or stupor  .35-.50              Comatose, Anesthesia, Subnormal body temperature       POCT GLUCOSE FINGERSTICK   POCT GLUCOSE FINGERSTICK   POCT GLUCOSE FINGERSTICK   POCT GLUCOSE FINGERSTICK   POCT GLUCOSE FINGERSTICK   POCT GLUCOSE FINGERSTICK   POCT GLUCOSE FINGERSTICK   POCT GLUCOSE FINGERSTICK   TYPE AND SCREEN   CBC AND DIFFERENTIAL    Narrative:     The following orders were created for panel order CBC & Differential.  Procedure                               Abnormality         Status                     ---------                               -----------         ------                     CBC Auto Differential[[id number]]        Abnormal            Final result                 Please view results for these tests on the individual orders.   GREEN TOP   GOLD TOP - SST   LIGHT BLUE TOP   CBC AND DIFFERENTIAL    Narrative:     The following orders were created for panel order CBC & Differential.  Procedure                               Abnormality         Status                     ---------                               -----------         ------                     CBC Auto Differential[[id number]]        Abnormal            Final result                 Please view results for these tests on the individual orders.       LOC: Person, Place, Time, and Situation    Telemetry:  Telemetry    Cardiac Monitoring Ordered: yes    EKG:   ECG 12 Lead Stroke  Evaluation   Final Result   HEART RATE=67  bpm   RR Zhcwfcbf=375  ms   NM Xpuuemtt=496  ms   P Horizontal Axis=37  deg   P Front Axis=-3  deg   QRSD Vveyrjhs=139  ms   QT Sqtgiuwa=072  ms   XLwM=665  ms   QRS Axis=13  deg   T Wave Axis=44  deg   - NORMAL ECG -   Sinus rhythm   When compared with ECG of 24-Mar-2018 21:46:39,   Significant change in rhythm   Electronically Signed By: Joshua Choi (Jose) 2024-11-15 10:09:21   Date and Time of Study:2024-11-14 16:08:18          Medications Given in the ED:   Medications   sodium chloride 0.9 % flush 10 mL (has no administration in time range)   Phosphorus Replacement - Follow Nurse / BPA Driven Protocol (has no administration in time range)   sodium chloride 0.9 % infusion (100 mL/hr Intravenous New Bag 11/15/24 1030)   sodium chloride 0.9 % flush 10 mL ( Intravenous Canceled Entry 11/15/24 1031)   sodium chloride 0.9 % flush 10 mL (has no administration in time range)   sodium chloride 0.9 % infusion 40 mL (has no administration in time range)   aspirin tablet 325 mg (325 mg Oral Given 11/15/24 0030)     Or   aspirin suppository 300 mg ( Rectal Not Given:  See Alt 11/15/24 0030)   atorvastatin (LIPITOR) tablet 80 mg (80 mg Oral Given 11/15/24 0030)   ondansetron (ZOFRAN) injection 4 mg (has no administration in time range)   bisacodyl (DULCOLAX) suppository 10 mg (has no administration in time range)   cefTRIAXone (ROCEPHIN) 2,000 mg in sodium chloride 0.9 % 100 mL MBP (has no administration in time range)   Potassium Replacement - Follow Nurse / BPA Driven Protocol (has no administration in time range)   Magnesium Standard Dose Replacement - Follow Nurse / BPA Driven Protocol (has no administration in time range)   Calcium Replacement - Follow Nurse / BPA Driven Protocol (has no administration in time range)   nicotine (NICODERM CQ) 21 MG/24HR patch 1 patch (1 patch Transdermal Medication Applied 11/15/24 0907)   carBAMazepine (TEGretol) tablet 200 mg (200 mg Oral  Given 11/15/24 1157)   escitalopram (LEXAPRO) tablet 10 mg (10 mg Oral Given 11/15/24 1158)   carBAMazepine (TEGretol) tablet 200 mg (has no administration in time range)   carBAMazepine (TEGretol) tablet 300 mg (has no administration in time range)     And   carBAMazepine (TEGretol) tablet 200 mg (has no administration in time range)   carBAMazepine (TEGretol) tablet 300 mg (has no administration in time range)   iopamidol (ISOVUE-370) 76 % injection 50 mL (50 mL Intravenous Given 11/14/24 1527)   Naloxone HCl (NARCAN) injection 2 mg (2 mg Intravenous Given 11/14/24 1529)   iopamidol (ISOVUE-370) 76 % injection 100 mL (100 mL Intravenous Given 11/14/24 1529)   cefTRIAXone (ROCEPHIN) 2,000 mg in sodium chloride 0.9 % 100 mL MBP (2,000 mg Intravenous New Bag 11/15/24 0031)       Imaging results:  MRI Brain Without Contrast    Result Date: 11/14/2024  Impression: No acute intracranial abnormality. Scattered periventricular and subcortical T2/FLAIR hyperintensities are nonspecific but most likely represents chronic small vessel ischemic changes. Electronically Signed: Adama Winters DO  11/14/2024 10:15 PM EST  Workstation ID: [id number]    CT Angiogram Head w AI Analysis of LVO    Result Date: 11/14/2024  1.No acute abnormality is identified within the large arteries of the head or neck. 2.No significant stenosis of the bilateral internal carotid arteries. Electronically Signed: Seth Kapoor MD  11/14/2024 4:24 PM EST  Workstation ID: [id number]    CT Angiogram Neck    Result Date: 11/14/2024  1.No acute abnormality is identified within the large arteries of the head or neck. 2.No significant stenosis of the bilateral internal carotid arteries. Electronically Signed: Seth Kapoor MD  11/14/2024 4:24 PM EST  Workstation ID: [id number]    XR Chest 1 View    Result Date: 11/14/2024  Impression: No acute chest finding Electronically Signed: Nereida Erwin MD  11/14/2024 4:21 PM EST  Workstation ID: [id number]    CT  CEREBRAL PERFUSION WITH & WITHOUT CONTRAST    Result Date: 11/14/2024  Impression: Normal, negative CT cerebral perfusion study. Electronically Signed: Nereida Erwin MD  11/14/2024 3:32 PM EST  Workstation ID: [id number]    CT Head Without Contrast Stroke Protocol    Result Date: 11/14/2024  Impression: No acute intracranial finding. Electronically Signed: Charly Starkey  11/14/2024 3:20 PM EST  Workstation ID: [id number]     Social issues:   Social History     Socioeconomic History    Marital status:    Tobacco Use    Smoking status: Every Day     Types: Cigarettes    Smokeless tobacco: Never    Tobacco comments:     3 CIGS A DAY   Vaping Use    Vaping status: Never Used   Substance and Sexual Activity    Alcohol use: Yes     Comment:  1 to 2  beers a week    Drug use: Yes     Types: Marijuana     Comment:  has used  last use 4/13/21    Sexual activity: Defer       NIH Stroke Scale:  Interval: baseline (11/15/24 0100)  1a. Level of Consciousness: 0-->Alert, keenly responsive (11/15/24 1241)  1b. LOC Questions: 0-->Answers both questions correctly (11/15/24 1241)  1c. LOC Commands: 0-->Performs both tasks correctly (11/15/24 1241)  2. Best Gaze: 0-->Normal (11/15/24 1241)  3. Visual: 1-->Partial hemianopia (11/15/24 1241)  4. Facial Palsy: 0-->Normal symmetrical movements (11/15/24 1241)  5a. Motor Arm, Left: 0-->No drift, limb holds 90 (or 45) degrees for full 10 secs (11/15/24 1241)  5b. Motor Arm, Right: 0-->No drift, limb holds 90 (or 45) degrees for full 10 secs (11/15/24 1241)  6a. Motor Leg, Left: 0-->No drift, leg holds 30 degree position for full 5 secs (11/15/24 1241)  6b. Motor Leg, Right: 0-->No drift, leg holds 30 degree position for full 5 secs (11/15/24 1241)  7. Limb Ataxia: 0-->Absent (11/15/24 1241)  8. Sensory: 0-->Normal, no sensory loss (11/15/24 1241)  9. Best Language: 0-->No aphasia, normal (11/15/24 1241)  10. Dysarthria: 0-->Normal (11/15/24 1241)  11. Extinction and Inattention  (formerly Neglect): 0-->No abnormality (11/15/24 1241)    Total (NIH Stroke Scale): 1 (11/15/24 1241)     Additional notable assessment information:VSS.  No distress present.  Up ad geronimo, independent with ADLs.  Spouse bedside and supportive of care. Swallow screen passed, PO diet and tolerating well; no complications with eating/swallowing.  EEG pending.  Neurology following.  Serial NIHSS negative, and no longer indicated.       Nursing report ED to floor:  RALF Casillas RN   11/15/24 12:45 EST

## 2024-11-15 NOTE — ED PROVIDER NOTES
Subjective   History of Present Illness  54-year-old male reportedly had weakness on his left side and presented with of his left side.  Patient had a syncopal episode while working earlier today.  The patient later reported that he did not feel the syncopal episode coming on but did have lightheadedness today with position change.  He reports that he has had some recent dysuria.  He states he worries about having seizures and so he smokes marijuana marijuana because he knows that helps.  The patient significant other reports that he was felt to be a tPA candidate for a left-sided stroke in 2018 and apparently did receive the thrombolytic.  The patient is currently not on anticoagulation therapy.  The patient had had no problems with speech earlier in the day and denied visual field changes.  The patient reported that he felt hot and had some chills earlier in the day      Review of Systems   Constitutional:  Positive for fatigue. Negative for chills and fever.   Cardiovascular:  Negative for chest pain.   Gastrointestinal:  Negative for abdominal pain.   Neurological:  Positive for seizures, syncope, weakness, light-headedness and headaches. Negative for dizziness, tremors, facial asymmetry and numbness.   All other systems reviewed and are negative.      Past Medical History:   Diagnosis Date    Arthritis     Low back pain     Seizure     Stroke (cerebrum)     2018       Allergies   Allergen Reactions    Morphine Unknown - High Severity     Unknown, pt mother told him he had a bad reaction as a child        Past Surgical History:   Procedure Laterality Date    FINGER SURGERY      rt  little finger cut off     HERNIA REPAIR         Family History   Problem Relation Age of Onset    Hypertension Mother     COPD Mother     Diabetes Mother        Social History     Socioeconomic History    Marital status:    Tobacco Use    Smoking status: Every Day     Types: Cigarettes    Smokeless tobacco: Never    Tobacco  comments:     3 CIGS A DAY   Vaping Use    Vaping status: Never Used   Substance and Sexual Activity    Alcohol use: Yes     Comment:  1 to 2  beers a week    Drug use: Yes     Types: Marijuana     Comment:  has used  last use 4/13/21    Sexual activity: Defer           Objective   Physical Exam  Lethargic initial [place] Coma Scale 14 initially rapidly improved to 15 with c attempted atheter insertion   HEENT: Pupils equal and reactive to light. Conjunctivae are not injected. Normal tympanic membranes. Oropharynx and nares are normal.   Neck: Supple. Midline trachea. No JVD. No goiter.   Chest: Clear and equal breath sounds bilaterally, regular rate and rhythm without murmur or rub.   Abdomen: Positive bowel sounds, nontender, nondistended. No rebound or peritoneal signs. No CVA tenderness.   Extremities/neuro historically right handed noncompliant for cranial nerve testing initially had decreased  and motion and his left arm would not move it against gravity.  Patient intermittently avoided a dropped hand went on by different providers.  Gait was not assessed no clubbing. cyanosis or edema. Motor sensory exam is normal. The full range of motion is intact   Skin: Warm and dry, no rashes or petechia.   Lymphatic: No regional lymphadenopathy. No calf pain, swelling or Homans sign    Procedures           ED Course                                           Labs Reviewed   COMPREHENSIVE METABOLIC PANEL - Abnormal; Notable for the following components:       Result Value    Glucose 101 (*)     All other components within normal limits    Narrative:     GFR Normal >60  Chronic Kidney Disease <60  Kidney Failure <15     CK - Abnormal; Notable for the following components:    Creatine Kinase 251 (*)     All other components within normal limits   PHOSPHORUS - Abnormal; Notable for the following components:    Phosphorus 1.8 (*)     All other components within normal limits   CBC WITH AUTO DIFFERENTIAL - Abnormal;  Notable for the following components:    WBC 11.45 (*)     Neutrophils, Absolute 7.52 (*)     Monocytes, Absolute 1.22 (*)     All other components within normal limits   URINE DRUG SCREEN - Abnormal; Notable for the following components:    THC, Screen, Urine Positive (*)     All other components within normal limits    Narrative:     Cutoff For Drugs Screened:    Amphetamines               500 ng/ml  Barbiturates               200 ng/ml  Benzodiazepines            150 ng/ml  Cocaine                    150 ng/ml  Methadone                  200 ng/ml  Opiates                    100 ng/ml  Phencyclidine               25 ng/ml  THC                         50 ng/ml  Methamphetamine            500 ng/ml  Tricyclic Antidepressants  300 ng/ml  Oxycodone                  100 ng/ml  Buprenorphine               10 ng/ml    The normal value for all drugs tested is negative. This report includes unconfirmed screening results, with the cutoff values listed, to be used for medical treatment purposes only.  Unconfirmed results must not be used for non-medical purposes such as employment or legal testing.  Clinical consideration should be applied to any drug of abuse test, particularly when unconfirmed results are used.    All urine drugs of abuse requests without chain of custody are for medical screening purposes only.  False positives are possible.     URINALYSIS W/ CULTURE IF INDICATED - Abnormal; Notable for the following components:    Appearance, UA Cloudy (*)     Specific Gravity, UA 1.037 (*)     Ketones, UA Trace (*)     Leuk Esterase, UA Small (1+) (*)     Nitrite, UA Positive (*)     All other components within normal limits    Narrative:     In absence of clinical symptoms, the presence of pyuria, bacteria, and/or nitrites on the urinalysis result does not correlate with infection.   URINALYSIS, MICROSCOPIC ONLY - Abnormal; Notable for the following components:    WBC, UA 21-50 (*)     Bacteria, UA 4+ (*)     All  other components within normal limits   POCT GLUCOSE FINGERSTICK - Abnormal; Notable for the following components:    Glucose 111 (*)     All other components within normal limits   PROTIME-INR - Normal   APTT - Normal   SINGLE HS TROPONIN T - Normal    Narrative:     High Sensitive Troponin T Reference Range:  <14.0 ng/L- Negative Female for AMI  <22.0 ng/L- Negative Male for AMI  >=14 - Abnormal Female indicating possible myocardial injury.  >=22 - Abnormal Male indicating possible myocardial injury.   Clinicians would have to utilize clinical acumen, EKG, Troponin, and serial changes to determine if it is an Acute Myocardial Infarction or myocardial injury due to an underlying chronic condition.        MAGNESIUM - Normal   URINE CULTURE   RAINBOW DRAW    Narrative:     The following orders were created for panel order [place] Draw.  Procedure                               Abnormality         Status                     ---------                               -----------         ------                     Green Top (Gel)[[id number]]                                  Final result               Lavender Top[[id number]]                                                                Gold Top - SST[[id number]]                                   Final result               Light Blue Top[[id number]]                                   Final result                 Please view results for these tests on the individual orders.   PROLACTIN   ETHANOL, URINE   HEMOGLOBIN A1C   LIPID PANEL   TSH   POCT GLUCOSE FINGERSTICK   POCT GLUCOSE FINGERSTICK   POCT GLUCOSE FINGERSTICK   POCT GLUCOSE FINGERSTICK   POCT GLUCOSE FINGERSTICK   TYPE AND SCREEN   CBC AND DIFFERENTIAL    Narrative:     The following orders were created for panel order CBC & Differential.  Procedure                               Abnormality         Status                     ---------                               -----------         ------                     CBC Auto  Differential[[id number]]        Abnormal            Final result                 Please view results for these tests on the individual orders.   GREEN TOP   GOLD TOP - SST   LIGHT BLUE TOP     .EDS  CT Angiogram Head w AI Analysis of LVO    Result Date: 11/14/2024  1.No acute abnormality is identified within the large arteries of the head or neck. 2.No significant stenosis of the bilateral internal carotid arteries. Electronically Signed: Seth Kapoor MD  11/14/2024 4:24 PM EST  Workstation ID: [id number]    CT Angiogram Neck    Result Date: 11/14/2024  1.No acute abnormality is identified within the large arteries of the head or neck. 2.No significant stenosis of the bilateral internal carotid arteries. Electronically Signed: Seth Kapoor MD  11/14/2024 4:24 PM EST  Workstation ID: [id number]    XR Chest 1 View    Result Date: 11/14/2024  Impression: No acute chest finding Electronically Signed: Nereida Erwin MD  11/14/2024 4:21 PM EST  Workstation ID: [id number]    CT CEREBRAL PERFUSION WITH & WITHOUT CONTRAST    Result Date: 11/14/2024  Impression: Normal, negative CT cerebral perfusion study. Electronically Signed: Nereida Erwin MD  11/14/2024 3:32 PM EST  Workstation ID: [id number]    CT Head Without Contrast Stroke Protocol    Result Date: 11/14/2024  Impression: No acute intracranial finding. Electronically Signed: Charly Starkey  11/14/2024 3:20 PM EST  Workstation ID: [id number]                 Medical Decision Making  Patient was noted to suddenly wake up and be able to move his left arm normally when nursing staff attempted to catheterize him for urine drug screen.  Patient became verbally abusive and combative at that time.  The patient passed a swallowing study and stated that if he was allowed to be fed then he would consent to have an MRI done.  The patient will be observed overnight the patient may need EEG as the episode was more likely to have been a seizure than actual TIA based on subsequent  information.  The patient was discussed with radiologist and neurology and also seen in the CT area by the neurologist.  At time of dictation the patient was agreeable to this plan to treat [place] Coma Scale is 15 NIH was 0    Amount and/or Complexity of Data Reviewed  Independent Historian:      Details: Significant other  Labs: ordered. Decision-making details documented in ED Course.  Radiology: ordered and independent interpretation performed.  ECG/medicine tests: ordered and independent interpretation performed.     Details: Sinus rhythm no acute ischemic or injury pattern    Risk  OTC drugs.  Prescription drug management.  Decision regarding hospitalization.    Phosphorus replacement was started in the emergency    Final diagnoses:   Syncope and collapse   Acute left-sided weakness   History of seizure   Acute urinary tract infection   Marijuana use       ED Disposition  ED Disposition       ED Disposition   Decision to Admit    Condition   --    Comment   Level of Care: Telemetry [5]   Diagnosis: Syncope and collapse [780.2.ICD-9-CM]   Admitting Physician: ANTON CONTEH [[id number]]   Attending Physician: ANTON CONTEH [[id number]]                 No follow-up provider specified.       Medication List      No changes were made to your prescriptions during this visit.            Joshua Choi MD  11/14/24 0988

## 2024-11-15 NOTE — CASE MANAGEMENT/SOCIAL WORK
Discharge Planning Assessment   [place]     Patient Name: Juaquin Pena  MRN: [id number]  Today's Date: 11/15/2024    Admit Date: 11/14/2024    Plan: From home: PT/OT pending   Discharge Needs Assessment       Row Name 11/15/24 1236       Living Environment    People in Home spouse    Name(s) of People in Home spouse Ledy    Current Living Arrangements home    Potentially Unsafe Housing Conditions none    In the past 12 months has the electric, gas, oil, or water company threatened to shut off services in your home? No    Primary Care Provided by self    Provides Primary Care For no one    Family Caregiver if Needed spouse    Family Caregiver Names Spouse Ledy    Quality of Family Relationships helpful;involved;supportive    Able to Return to Prior Arrangements yes       Resource/Environmental Concerns    Resource/Environmental Concerns none    Transportation Concerns none       Transportation Needs    In the past 12 months, has lack of transportation kept you from medical appointments or from getting medications? no    In the past 12 months, has lack of transportation kept you from meetings, work, or from getting things needed for daily living? No       Food Insecurity    Within the past 12 months, you worried that your food would run out before you got the money to buy more. Never true    Within the past 12 months, the food you bought just didn't last and you didn't have money to get more. Never true       Transition Planning    Patient/Family Anticipates Transition to home with family    Patient/Family Anticipated Services at Transition none    Transportation Anticipated family or friend will provide  Spouse Ledy can transport at d/c.       Discharge Needs Assessment    Readmission Within the Last 30 Days no previous admission in last 30 days    Equipment Currently Used at Home none    Do you want help finding or keeping work or a job? I do not need or want help    Do you want help with school or training?  For example, starting or completing job training or getting a high school diploma, GED or equivalent No                   Discharge Plan       Row Name 11/15/24 1239       Plan    Plan From home: PT/OT pending    Patient/Family in Agreement with Plan yes    Plan Comments CM spoke to pt. Juaquin and spouse Ledy at bedside. PCP and pharmacy confirmed. Pt. denies transportation or medication issues. Pt. and spouse stated at times they have financial issues d/t issues in recent past. Spouse states we are managing now. Pt. and spouse agreed to [employer] brochure. Pt's spouse Ledy can transport at d/c. Pt. agrees to M2B. DC barriers: PT/OT pending                  Continued Care and Services - Admitted Since 11/14/2024    No active coordination exists for this encounter.          Demographic Summary       Row Name 11/15/24 1233       General Information    Admission Type observation    Arrived From home    Referral Source admission list    Reason for Consult discharge planning    Preferred Language English       Contact Information    Permission Granted to Share Info With     Contact Information Obtained for                    Functional Status       Row Name 11/15/24 1236       Functional Status    Usual Activity Tolerance excellent    Current Activity Tolerance excellent       Physical Activity    On average, how many days per week do you engage in moderate to strenuous exercise (like a brisk walk)? 7 days    On average, how many minutes do you engage in exercise at this level? 150+ min    Number of minutes of exercise per week 1050       Functional Status, IADL    Medications independent    Meal Preparation independent    Housekeeping independent    Laundry independent    Shopping independent    If for any reason you need help with day-to-day activities such as bathing, preparing meals, shopping, managing finances, etc., do you get the help you need? I don't need any help                    Social Drivers of Health     Tobacco Use: High Risk (11/14/2024)    Patient History     Smoking Tobacco Use: Every Day     Smokeless Tobacco Use: Never     Passive Exposure: Not on file   Alcohol Use: Not At Risk (11/15/2024)    AUDIT-C     Frequency of Alcohol Consumption: 2-4 times a month     Average Number of Drinks: 1 or 2     Frequency of Binge Drinking: Never   Financial Resource Strain: Low Risk  (11/15/2024)    Overall Financial Resource Strain (CARDIA)     Difficulty of Paying Living Expenses: Not hard at all   Food Insecurity: No Food Insecurity (11/15/2024)    Hunger Vital Sign     Worried About Running Out of Food in the Last Year: Never true     Ran Out of Food in the Last Year: Never true   Transportation Needs: No Transportation Needs (11/15/2024)    PRAPARE - Transportation     Lack of Transportation (Medical): No     Lack of Transportation (Non-Medical): No   Physical Activity: Sufficiently Active (11/15/2024)    Exercise Vital Sign     Days of Exercise per Week: 7 days     Minutes of Exercise per Session: 150+ min   Stress: No Stress Concern Present (11/15/2024)    [employer] - Occupational Stress Questionnaire     Feeling of Stress : Not at all   Social Connections: Not At Risk (11/15/2024)    Family and Community Support     Help with Day-to-Day Activities: I don't need any help     Lonely or Isolated: Never   Interpersonal Safety: Not At Risk (11/15/2024)    Abuse Screen     Unsafe at Home or Work/School: no     Feels Threatened by Someone?: no     Does Anyone Keep You from Contacting Others or Doint Things Outside the Home?: no     Physical Sign of Abuse Present: no   Depression: Not at risk (11/15/2024)    PHQ-2     PHQ-2 Score: 0   Housing Stability: Not At Risk (11/15/2024)    Housing Stability     Current Living Arrangements: home     Potentially Unsafe Housing Conditions: none   Utilities: Not At Risk (11/15/2024)    [facility] Utilities     Threatened  with loss of utilities: No   Health Literacy: Not At Risk (11/15/2024)    Education     Help with school or training?: No     Preferred Language: English   Employment: Not At Risk (11/15/2024)    Employment     Do you want help finding or keeping work or a job?: I do not need or want help   Disabilities: Not At Risk (11/15/2024)    Disabilities     Concentrating, Remembering, or Making Decisions Difficulty: no     Doing Errands Independently Difficulty: no     Argelia Yuan RN    Office: [phone]  Fax: [phone]  [email or web address]

## 2024-11-15 NOTE — PLAN OF CARE
"ST consulted to complete speech and language evaluation d/t stroke orders; however, MRI imaging showed the following: \"No acute intracranial abnormality.   Scattered periventricular and subcortical T2/FLAIR hyperintensities are nonspecific but most likely represents chronic small vessel ischemic changes.\" Additionally, pt passed the nursing swallow screen and has a GCS score of 15. Nursing states no further concerns at this time. ST will sign off. Please re-consult if further concerns arise.  "

## 2024-11-15 NOTE — PLAN OF CARE
Goal Outcome Evaluation:      Discussed plan of care with pt and SO. Pt was upset about being placed NPO but was agreeable when educated as to why.

## 2024-11-15 NOTE — CONSULTS
Primary Care Provider: Provider, No Known     Consult requested by: Tena Marei NP    Reason for Consultation: Neurological evaluation, code stroke     History taken from: patient chart family RN    Chief complaint: syncopal episode, left side weakness        SUBJECTIVE:    History of present illness: Background per H&P: Juaquin Pena is a 54 y.o. male with a past medical history of EtOH abuse, seizures, traumatic brain injury 2004, past homelessness, who presented to [facility] on 11/14/2024 with reports of left-sided weakness and a syncopal episode while at work today.  Reports he just passed out suddenly and then woke up is feeling a little lightheaded.  Denies any confusion or headache but does report some blurry vision.  He reports after that episode his left side of his neck and his arm and his leg started to get numb and weak.  He reports his boss was present and denies seeing any tonic-clonic movements.  Has a past medical history of seizures and took himself off Tegretol many years ago.  He reports he worries about having seizures and so he smokes marijuana which he thinks helps.  Review of records shows he was seen here in February 2018 with complaints of left-sided weakness and was given TNKase subsequent CT scans and MRI brain showed no stroke.  He was seen a month later in March 2018 after having multiple seizures.  He was discharged on Keppra at that time.  Currently on no home medications.  Patient reports some diaphoresis and chills earlier in the day send some dysuria.  Review of records patient was given Narcan in the emergency department and suddenly became responsive no further extremity weakness.  He is awake and alert and answering appropriately.  No signs of facial droop or slurred speech.  No focal deficits identified at this time.  Urine drug screen positive for THC.  Ethanol ordered and pending.  Per review of notes RN attempted to get a urine sample with an In-N-Out  catheter and patient suddenly became responsive and combative and security needed to be called.  Neurology was consulted from the emergency department and has examined the patient.  CT head, CTA head and neck and CT perfusion were all negative for acute per radiology.  MRI brain showed no acute intracranial abnormality per radiology did show scattered periventricular and subcortical T2 flair hyperintensities are nonspecific and most likely represent small chronic vessel ischemic changes.  Due to rapid improvement after the administration of Narcan, TNK was held per neurology.  Labs today showed a creatinine kinase of 251 glucose 101 phosphorus 1.8, WBC 11.45, urinalysis shows positive nitrites 1+ leukocytes 21-50 WBCs and 4+ bacteria.  He was started on 2 g IV ceftriaxone with urine culture pending.  Phosphorus replacement ordered.     - Portions of the above HPI were copied from previous encounters and edited as appropriate. PMH as detailed below.    History obtained from both patient and his wife at bedside.  Patient today is much more awake and pleasant.  History similar to H&P that as above.  Patient states that he was aware of everything happening when he came into the hospital yesterday but was unable to respond or move. He is aware of what happened and does have memory from the event. He denies having a headache. Prior to coming into the hospital his wife noticed left facial droop and weakness in the left arm and leg. Code stroke was called on arrival to [facility]. Patient was evaluated by myself and Dr. Obregon. Pt did have left side weakness but also was not respond and would not consistently follow commands. CTH, CTA and CTP all completed and did not show any acute findings. Pt was given Narcan in CT suite by Dr. Choi and pt did start moving the left arm and leg. He did start talking then. TNK was not given due to improvement of symptoms. Full stroke work up requested.     Review of Systems   Constitutional:   Positive for fatigue.   HENT: Negative.     Eyes:  Positive for visual disturbance (left eye blurred vision, irritated).   Respiratory: Negative.     Cardiovascular: Negative.    Gastrointestinal:  Negative for nausea and vomiting.   Endocrine: Negative.    Genitourinary: Negative.    Musculoskeletal: Negative.    Allergic/Immunologic: Negative.    Neurological:  Positive for seizures, weakness (left side), light-headedness and numbness. Negative for dizziness, tremors, syncope, facial asymmetry, speech difficulty and headaches.   Hematological: Negative.    Psychiatric/Behavioral:  Positive for confusion.         PATIENT HISTORY:  Past Medical History:   Diagnosis Date    Arthritis     Low back pain     Seizure     Stroke (cerebrum)     2018   ,   Past Surgical History:   Procedure Laterality Date    FINGER SURGERY      rt  little finger cut off     HERNIA REPAIR     ,   Family History   Problem Relation Age of Onset    Hypertension Mother     COPD Mother     Diabetes Mother    ,   Social History     Tobacco Use    Smoking status: Every Day     Types: Cigarettes    Smokeless tobacco: Never    Tobacco comments:     3 CIGS A DAY   Vaping Use    Vaping status: Never Used   Substance Use Topics    Alcohol use: Yes     Comment:  1 to 2  beers a week    Drug use: Yes     Types: Marijuana     Comment:  has used  last use 4/13/21   ,   Prior to Admission medications    Not on File    Allergies:  Morphine    Current Facility-Administered Medications   Medication Dose Route Frequency Provider Last Rate Last Admin    aspirin tablet 325 mg  325 mg Oral Daily Tena Marie APRN   325 mg at 11/15/24 0030    Or    aspirin suppository 300 mg  300 mg Rectal Daily Tena Marie APRN        atorvastatin (LIPITOR) tablet 80 mg  80 mg Oral Nightly Jodi-Tena Edouard APRN   80 mg at 11/15/24 0030    bisacodyl (DULCOLAX) suppository 10 mg  10 mg Rectal Daily PRN Tena Marie APRN        Calcium Replacement  - Follow Nurse / BPA Driven Protocol   Not Applicable PRN Tena Marie APRN        cefTRIAXone (ROCEPHIN) 2,000 mg in sodium chloride 0.9 % 100 mL MBP  2,000 mg Intravenous Q24H EssingTena oLckhart APRN        Magnesium Standard Dose Replacement - Follow Nurse / BPA Driven Protocol   Not Applicable PRN Essing-Tena Edouard APRN        nicotine (NICODERM CQ) 21 MG/24HR patch 1 patch  1 patch Transdermal Q24H Tena Marie APRN   1 patch at 11/15/24 0907    ondansetron (ZOFRAN) injection 4 mg  4 mg Intravenous Q6H PRN Rahuling-Tena Edouard APRN        Phosphorus Replacement - Follow Nurse / BPA Driven Protocol   Not Applicable PRN Joshua Choi MD        Potassium Replacement - Follow Nurse / BPA Driven Protocol   Not Applicable PRN Jodi-Tena Edouard APRN        sodium chloride 0.9 % flush 10 mL  10 mL Intravenous PRN Joshua Choi MD        sodium chloride 0.9 % flush 10 mL  10 mL Intravenous Q12H Rahuling-Tena Edouard APRN        sodium chloride 0.9 % flush 10 mL  10 mL Intravenous PRN Essing-Tena Edouard APRN        sodium chloride 0.9 % infusion 40 mL  40 mL Intravenous PRN Jodi-Tena Edouard APRN        sodium chloride 0.9 % infusion  100 mL/hr Intravenous Continuous Joshua Choi  mL/hr at 11/15/24 0032 100 mL/hr at 11/15/24 0032     No current outpatient medications on file.        ________________________________________________________        OBJECTIVE:    PHYSICAL EXAM:    Constitutional: The patient is in no apparent distress, awake and alert. There is no shortness of breath.   PSYCHIATRIC: Mood/affect normal, judgement normal, appropriate  HEENT:  Normocephalic, atraumatic. Poor dentation   Chest: Breathing unlabored  Cardiac: Regular rate and rhythm.   Extremities:  No clubbing, cyanosis or edema.    NEUROLOGICAL:    Cognition:   Fully oriented.  Fund of knowledge excellent.  Concentration and attention normal.   Language normal with normal  comprehension, fluent speech, intact repetition and naming.   Short and long term memory appears intact    Cranial nerves;    II - pupils bilaterally equal reacting to light,  No new Visual field deficits;  Fundoscopic exam- Not able to be done, non-dilated exam  III,IV,VI: EOMI with no diplopia  V: decreased left facial sensations  VII: No facial asymmetry,  VIII: No New hearing abnormality  IX, X, XI: normal gag and shoulder shrug;  XII: tongue is in the midline.    Sensory:  decreased sensation left face, arm and leg     Motor: Strength 5-/5 LLE, normal strength otherwise     No involuntary movements present. Normal tone and bulk.    Cerebellar: Finger to nose and mirror movements normal bilaterally.    Gait and balance: Deferred.     Physical exam performed by LUZMA Law.     ________________________________________________________   RESULTS REVIEW:    VITAL SIGNS:   Temp:  [97.6 °F (36.4 °C)-98.6 °F (37 °C)] 97.7 °F (36.5 °C)  Heart Rate:  [58-92] 58  Resp:  [10-16] 16  BP: (115-169)/(63-97) 116/63     LABS:      Lab 11/15/24  0103 11/14/24  1538   WBC 9.02 11.45*   HEMOGLOBIN 13.5 14.1   HEMATOCRIT 40.1 43.0   PLATELETS 347 351   NEUTROS ABS 4.65 7.52*   IMMATURE GRANS (ABS) 0.03 0.03   LYMPHS ABS 2.90 2.57   MONOS ABS 1.26* 1.22*   EOS ABS 0.13 0.05   MCV 92.0 92.3   PROTIME  --  12.7   APTT  --  26.1         Lab 11/15/24  0103 11/14/24  1538   SODIUM 138 139   POTASSIUM 3.7 4.2   CHLORIDE 103 101   CO2 26.6 28.3   ANION GAP 8.4 9.7   BUN 12 12   CREATININE 1.04 1.11   EGFR 85.3 78.9   GLUCOSE 110* 101*   CALCIUM 9.0 9.6   MAGNESIUM  --  2.2   PHOSPHORUS  --  1.8*   HEMOGLOBIN A1C  --  5.55   TSH 1.230  --          Lab 11/14/24  1538   TOTAL PROTEIN 7.4   ALBUMIN 4.4   GLOBULIN 3.0   ALT (SGPT) 18   AST (SGOT) 37   BILIRUBIN 0.7   ALK PHOS 101         Lab 11/14/24  1538   HSTROP T 7   PROTIME 12.7   INR 0.95         Lab 11/15/24  0103   CHOLESTEROL 166   LDL CHOL 108*   HDL CHOL 42   TRIGLYCERIDES 83          Lab 11/14/24  1538   ABO TYPING B   RH TYPING Positive   ANTIBODY SCREEN Negative         UA          11/14/2024    15:51   Urinalysis   Squamous Epithelial Cells, UA 0-2    Specific Gravity, UA 1.037    Ketones, UA Trace    Blood, UA Negative    Leukocytes, UA Small (1+)    Nitrite, UA Positive    RBC, UA 0-2    WBC, UA 21-50    Bacteria, UA 4+        Lab Results   Component Value Date    TSH 1.230 11/15/2024     (H) 11/15/2024    HGBA1C 5.55 11/14/2024       IMAGING STUDIES:  MRI Brain Without Contrast    Result Date: 11/14/2024  Impression: No acute intracranial abnormality. Scattered periventricular and subcortical T2/FLAIR hyperintensities are nonspecific but most likely represents chronic small vessel ischemic changes. Electronically Signed: Adama Winters DO  11/14/2024 10:15 PM EST  Workstation ID: [id number]    CT Angiogram Head w AI Analysis of LVO    Result Date: 11/14/2024  1.No acute abnormality is identified within the large arteries of the head or neck. 2.No significant stenosis of the bilateral internal carotid arteries. Electronically Signed: Seth Kapoor MD  11/14/2024 4:24 PM EST  Workstation ID: [id number]    CT Angiogram Neck    Result Date: 11/14/2024  1.No acute abnormality is identified within the large arteries of the head or neck. 2.No significant stenosis of the bilateral internal carotid arteries. Electronically Signed: Seth Kapoor MD  11/14/2024 4:24 PM EST  Workstation ID: [id number]    XR Chest 1 View    Result Date: 11/14/2024  Impression: No acute chest finding Electronically Signed: Nereida Erwin MD  11/14/2024 4:21 PM EST  Workstation ID: [id number]    CT CEREBRAL PERFUSION WITH & WITHOUT CONTRAST    Result Date: 11/14/2024  Impression: Normal, negative CT cerebral perfusion study. Electronically Signed: Nereida Erwin MD  11/14/2024 3:32 PM EST  Workstation ID: [id number]    CT Head Without Contrast Stroke Protocol    Result Date: 11/14/2024  Impression: No acute  intracranial finding. Electronically Signed: Charly Starkey  11/14/2024 3:20 PM EST  Workstation ID: [id number]     I reviewed the patient's new clinical results.    ________________________________________________________     PROBLEM LIST:    Temporary weakness of extremity    AMS (altered mental status)    Hypophosphatemia    Marijuana use    Acute UTI (urinary tract infection)    Tobacco use            ASSESSMENT/PLAN:  Loss of consciousness, altered mental status with left side weakness. Probable seizure.   MRI brain was negative for acute/subacute stroke. Pt does have hx of seizures after TBI in 2000. Pt stopped taking his Tegretol years ago. He was started on Keppra after his last admission for seizure but felt that it caused significant fatigue.   - CT head reviewed - negative   - MRI brain negative for acute finding   - CTA head and neck: No significant stenosis or occlusion   - EKG: SR rate 67   - Labs: A1C: 5.55, B12: P, LDL: 108, TSH: 1.230  - EEG today   - Pt requesting to restart Tegretol- he has failed many AEDs in the past. Start Tegretol 200 mg BID- increased 200 QD once per week for a goal of 300 mg TID. Recommend follow up with Neurology outpatient- will refer to Dr. Seipel's office. Patient needs to have LFTs, Chem 7 and yearly DXA scans per primary care.     SEIZURE/SYNCOPE INSTRUCTIONS:  -Recommended to observe all seizure precautions, including, but not limited to:   -No driving until seizure free for more than 3 months- as per State driving regulation / law;   -Avoid all high-risk activity, Take showers instead of baths, Avoid swimming without observation, Avoid open heat sources, Avoid working at heights, and Avoid engaging in all potentially hazardous activities.   -Patient expressed clear understanding.    2.  Urinary tract infection  -Culture pending, antibiotics per primary    3.  Anxiety-discussed with patient, will start him on 10 mg escitalopram low-dose with plans for follow-up with  PCP outpatient. Discussed side effects.      EEG pending. Pt can be d/c home from Neurology standpoint. Patient already started back on AED for seizures.  Will refer patient to outpatient Neurology for follow up in 1 month.       I discussed the patient's findings and my recommendations with patient, family, and nursing staff    Mary Busby, SHANNAN  11/15/24  09:30 EST

## 2024-11-15 NOTE — H&P
[facility]  History & Physical    Patient Name: Juaquin Pena  : 1970  MRN: [id number]  Primary Care Physician:  Provider, No Known  Date of admission: 2024  Date and Time of Service: 2024 at 2145    Subjective      Chief Complaint: Left sided weakness     History of Present Illness: Juaquin Pena is a 54 y.o. male with a past medical history of EtOH abuse, seizures, traumatic brain injury , past homelessness, who presented to [facility] on 2024 with reports of left-sided weakness and a syncopal episode while at work today.  Reports he just passed out suddenly and then woke up is feeling a little lightheaded.  Denies any confusion or headache but does report some blurry vision.  He reports after that episode his left side of his neck and his arm and his leg started to get numb and weak.  He reports his boss was present and denies seeing any tonic-clonic movements.  Has a past medical history of seizures and took himself off Tegretol many years ago.  He reports he worries about having seizures and so he smokes marijuana which he thinks helps.  Review of records shows he was seen here in 2018 with complaints of left-sided weakness and was given TNKase subsequent CT scans and MRI brain showed no stroke.  He was seen a month later in 2018 after having multiple seizures.  He was discharged on Keppra at that time.  Currently on no home medications.  Patient reports some diaphoresis and chills earlier in the day send some dysuria.  Review of records patient was given Narcan in the emergency department and suddenly became responsive no further extremity weakness.  He is awake and alert and answering appropriately.  No signs of facial droop or slurred speech.  No focal deficits identified at this time.  Urine drug screen positive for THC.  Ethanol ordered and pending.  Per review of notes RN attempted to get a urine sample with an  In-N-Out catheter and patient suddenly became responsive and combative and security needed to be called.  Neurology was consulted from the emergency department and has examined the patient.  CT head, CTA head and neck and CT perfusion were all negative for acute per radiology.  MRI brain showed no acute intracranial abnormality per radiology did show scattered periventricular and subcortical T2 flair hyperintensities are nonspecific and most likely represent small chronic vessel ischemic changes.  Due to rapid improvement after the administration of Narcan, TNK was held per neurology.  Labs today showed a creatinine kinase of 251 glucose 101 phosphorus 1.8, WBC 11.45, urinalysis shows positive nitrites 1+ leukocytes 21-50 WBCs and 4+ bacteria.  He was started on 2 g IV ceftriaxone with urine culture pending.  Phosphorus replacement ordered.  Chest x-ray was negative per radiology.  EKG showed normal sinus rhythm heart rate 67 no acute ischemic changes.  He denies any past medical history of coronary artery disease.  2D echo ordered in a.m. given rapid syncope.  Given past medical history of seizures, seizure precautions have been put in place EEG has been ordered neurology is still following,   Stroke orders in place as precaution.  Ethanol level ordered and pending.  CIWA precautions pending if needed.  Patient does not appear to be intoxicated.  Wife at bedside assisting with history.  Wife reports patient under stress.    Review of Systems   HENT: Negative.     Eyes: Negative.    Respiratory: Negative.     Cardiovascular: Negative.    Gastrointestinal: Negative.    Endocrine: Negative.    Genitourinary:  Positive for dysuria.   Musculoskeletal: Negative.    Skin: Negative.    Allergic/Immunologic: Negative.    Neurological:  Positive for syncope, weakness and light-headedness.   Hematological: Negative.    Psychiatric/Behavioral: Negative.     All other systems reviewed and are negative.      Personal History      Past Medical History:   Diagnosis Date    Arthritis     Low back pain     Seizure     Stroke (cerebrum)     2018       Past Surgical History:   Procedure Laterality Date    FINGER SURGERY      rt  little finger cut off     HERNIA REPAIR         Family History: family history includes COPD in his mother; Diabetes in his mother; Hypertension in his mother. Otherwise pertinent FHx was reviewed and not pertinent to current issue.    Social History:  reports that he has been smoking cigarettes. He has never used smokeless tobacco. He reports current alcohol use. He reports current drug use. Drug: Marijuana.    Home Medications:  Prior to Admission Medications       Prescriptions Last Dose Informant Patient Reported? Taking?    HYDROcodone-acetaminophen (NORCO) 5-325 MG per tablet   No No    Take 1 tablet by mouth Every 6 (Six) Hours As Needed for Moderate Pain.    ibuprofen (ADVIL,MOTRIN) 200 MG tablet   Yes No    ondansetron ODT (ZOFRAN-ODT) 4 MG disintegrating tablet   No No    Place 1 tablet on the tongue Every 8 (Eight) Hours As Needed for Nausea.              Allergies:  Allergies   Allergen Reactions    Morphine Unknown - High Severity     Unknown, pt mother told him he had a bad reaction as a child        Objective      Vitals:   Temp:  [97.9 °F (36.6 °C)-98.5 °F (36.9 °C)] 98.5 °F (36.9 °C)  Heart Rate:  [63-92] 68  Resp:  [10-15] 15  BP: (115-169)/(64-97) 135/79  Body mass index is 25 kg/m².  Physical Exam  Vitals reviewed.   Constitutional:       Appearance: Normal appearance.   HENT:      Head: Normocephalic and atraumatic.      Right Ear: External ear normal.      Left Ear: External ear normal.      Nose: Nose normal.      Mouth/Throat:      Mouth: Mucous membranes are moist.   Eyes:      Extraocular Movements: Extraocular movements intact.   Cardiovascular:      Rate and Rhythm: Normal rate and regular rhythm.      Pulses: Normal pulses.      Heart sounds: Normal heart sounds.   Pulmonary:      Effort:  Pulmonary effort is normal.      Breath sounds: Normal breath sounds.   Abdominal:      Palpations: Abdomen is soft.   Genitourinary:     Comments: deferred  Musculoskeletal:         General: Normal range of motion.      Cervical back: Normal range of motion and neck supple.   Skin:     General: Skin is warm and dry.   Neurological:      General: No focal deficit present.      Mental Status: He is alert and oriented to person, place, and time.   Psychiatric:         Mood and Affect: Mood normal.         Behavior: Behavior normal.         Thought Content: Thought content normal.         Judgment: Judgment normal.         Diagnostic Data:  Lab Results (last 24 hours)       Procedure Component Value Units Date/Time    Prolactin [[id number]]  (Normal) Collected: 11/14/24 1538    Specimen: Blood Updated: 11/14/24 2212     Prolactin 10.90 ng/mL     Narrative:      Results may be falsely decreased if patient taking Biotin.     Hemoglobin A1c [[id number]]  (Normal) Collected: 11/14/24 1538    Specimen: Blood Updated: 11/14/24 2200     Hemoglobin A1C 5.55 %     Protime-INR [[id number]]  (Normal) Collected: 11/14/24 1538    Specimen: Blood Updated: 11/14/24 1715     Protime 12.7 Seconds      INR 0.95    aPTT [[id number]]  (Normal) Collected: 11/14/24 1538    Specimen: Blood Updated: 11/14/24 1715     PTT 26.1 seconds     Phosphorus [[id number]]  (Abnormal) Collected: 11/14/24 1538    Specimen: Blood Updated: 11/14/24 1615     Phosphorus 1.8 mg/dL     Comprehensive Metabolic Panel [[id number]]  (Abnormal) Collected: 11/14/24 1538    Specimen: Blood Updated: 11/14/24 1614     Glucose 101 mg/dL      BUN 12 mg/dL      Creatinine 1.11 mg/dL      Sodium 139 mmol/L      Potassium 4.2 mmol/L      Comment: Slight hemolysis detected by analyzer. Result may be falsely elevated.        Chloride 101 mmol/L      CO2 28.3 mmol/L      Calcium 9.6 mg/dL      Total Protein 7.4 g/dL      Albumin 4.4 g/dL      ALT (SGPT) 18 U/L      AST (SGOT) 37 U/L       Comment: Slight hemolysis detected by analyzer. Result may be falsely elevated.        Alkaline Phosphatase 101 U/L      Total Bilirubin 0.7 mg/dL      Globulin 3.0 gm/dL      A/G Ratio 1.5 g/dL      BUN/Creatinine Ratio 10.8     Anion Gap 9.7 mmol/L      eGFR 78.9 mL/min/1.73     Narrative:      GFR Normal >60  Chronic Kidney Disease <60  Kidney Failure <15      Magnesium [[id number]]  (Normal) Collected: 11/14/24 1538    Specimen: Blood Updated: 11/14/24 1614     Magnesium 2.2 mg/dL     Single High Sensitivity Troponin T [[id number]]  (Normal) Collected: 11/14/24 1538    Specimen: Blood Updated: 11/14/24 1614     HS Troponin T 7 ng/L     Narrative:      High Sensitive Troponin T Reference Range:  <14.0 ng/L- Negative Female for AMI  <22.0 ng/L- Negative Male for AMI  >=14 - Abnormal Female indicating possible myocardial injury.  >=22 - Abnormal Male indicating possible myocardial injury.   Clinicians would have to utilize clinical acumen, EKG, Troponin, and serial changes to determine if it is an Acute Myocardial Infarction or myocardial injury due to an underlying chronic condition.         CK [[id number]]  (Abnormal) Collected: 11/14/24 1538    Specimen: Blood Updated: 11/14/24 1614     Creatine Kinase 251 U/L     Urine Drug Screen - Urine, Clean Catch [[id number]]  (Abnormal) Collected: 11/14/24 1551    Specimen: Urine, Clean Catch Updated: 11/14/24 1613     THC, Screen, Urine Positive     Phencyclidine (PCP), Urine Negative     Cocaine Screen, Urine Negative     Methamphetamine, Ur Negative     Opiate Screen Negative     Amphetamine Screen, Urine Negative     Benzodiazepine Screen, Urine Negative     Tricyclic Antidepressants Screen Negative     Methadone Screen, Urine Negative     Barbiturates Screen, Urine Negative     Oxycodone Screen, Urine Negative     Buprenorphine, Screen, Urine Negative    Narrative:      Cutoff For Drugs Screened:    Amphetamines               500 ng/ml  Barbiturates               200  ng/ml  Benzodiazepines            150 ng/ml  Cocaine                    150 ng/ml  Methadone                  200 ng/ml  Opiates                    100 ng/ml  Phencyclidine               25 ng/ml  THC                         50 ng/ml  Methamphetamine            500 ng/ml  Tricyclic Antidepressants  300 ng/ml  Oxycodone                  100 ng/ml  Buprenorphine               10 ng/ml    The normal value for all drugs tested is negative. This report includes unconfirmed screening results, with the cutoff values listed, to be used for medical treatment purposes only.  Unconfirmed results must not be used for non-medical purposes such as employment or legal testing.  Clinical consideration should be applied to any drug of abuse test, particularly when unconfirmed results are used.    All urine drugs of abuse requests without chain of custody are for medical screening purposes only.  False positives are possible.      Urinalysis, Microscopic Only - Urine, Clean Catch [[id number]]  (Abnormal) Collected: 11/14/24 1551    Specimen: Urine, Clean Catch Updated: 11/14/24 1604     RBC, UA 0-2 /HPF      WBC, UA 21-50 /HPF      Bacteria, UA 4+ /HPF      Squamous Epithelial Cells, UA 0-2 /HPF      Hyaline Casts, UA None Seen /LPF      Methodology Automated Microscopy    Urine Culture - Urine, Urine, Clean Catch [[id number]] Collected: 11/14/24 1551    Specimen: Urine, Clean Catch Updated: 11/14/24 1604    Urinalysis With Culture If Indicated - Urine, Clean Catch [[id number]]  (Abnormal) Collected: 11/14/24 1551    Specimen: Urine, Clean Catch Updated: 11/14/24 1604     Color, UA Yellow     Appearance, UA Cloudy     pH, UA 6.0     Specific Gravity, UA 1.037     Glucose, UA Negative     Ketones, UA Trace     Bilirubin, UA Negative     Blood, UA Negative     Protein, UA Negative     Leuk Esterase, UA Small (1+)     Nitrite, UA Positive     Urobilinogen, UA 1.0 E.U./dL    Narrative:      In absence of clinical symptoms, the presence of  pyuria, bacteria, and/or nitrites on the urinalysis result does not correlate with infection.    [place] Draw [[id number]] Collected: 11/14/24 1538    Specimen: Blood Updated: 11/14/24 1600    Narrative:      The following orders were created for panel order [place] Draw.  Procedure                               Abnormality         Status                     ---------                               -----------         ------                     Green Top (Gel)[[id number]]                                  Final result               Lavender Top[[id number]]                                                                Gold Top - SST[[id number]]                                   Final result               Light Blue Top[[id number]]                                   Final result                 Please view results for these tests on the individual orders.    Green Top (Gel) [[id number]] Collected: 11/14/24 1538    Specimen: Blood Updated: 11/14/24 1600     Extra Tube Hold for add-ons.     Comment: Auto resulted.       Gold Top - SST [[id number]] Collected: 11/14/24 1538    Specimen: Blood Updated: 11/14/24 1600     Extra Tube Hold for add-ons.     Comment: Auto resulted.       Light Blue Top [[id number]] Collected: 11/14/24 1538    Specimen: Blood Updated: 11/14/24 1600     Extra Tube Hold for add-ons.     Comment: Auto resulted       CBC & Differential [[id number]]  (Abnormal) Collected: 11/14/24 1538    Specimen: Blood Updated: 11/14/24 1550    Narrative:      The following orders were created for panel order CBC & Differential.  Procedure                               Abnormality         Status                     ---------                               -----------         ------                     CBC Auto Differential[[id number]]        Abnormal            Final result                 Please view results for these tests on the individual orders.    CBC Auto Differential [[id number]]  (Abnormal) Collected: 11/14/24 1538     Specimen: Blood Updated: 11/14/24 1550     WBC 11.45 10*3/mm3      RBC 4.66 10*6/mm3      Hemoglobin 14.1 g/dL      Hematocrit 43.0 %      MCV 92.3 fL      MCH 30.3 pg      MCHC 32.8 g/dL      RDW 13.2 %      RDW-SD 44.8 fl      MPV 11.5 fL      Platelets 351 10*3/mm3      Neutrophil % 65.7 %      Lymphocyte % 22.4 %      Monocyte % 10.7 %      Eosinophil % 0.4 %      Basophil % 0.5 %      Immature Grans % 0.3 %      Neutrophils, Absolute 7.52 10*3/mm3      Lymphocytes, Absolute 2.57 10*3/mm3      Monocytes, Absolute 1.22 10*3/mm3      Eosinophils, Absolute 0.05 10*3/mm3      Basophils, Absolute 0.06 10*3/mm3      Immature Grans, Absolute 0.03 10*3/mm3      nRBC 0.0 /100 WBC     POC Glucose Once [[id number]]  (Abnormal) Collected: 11/14/24 1458    Specimen: Blood Updated: 11/14/24 1500     Glucose 111 mg/dL      Comment: Serial Number: [id number]:  [id number]                Imaging Results (Last 24 Hours)       Procedure Component Value Units Date/Time    MRI Brain Without Contrast [[id number]] Collected: 11/14/24 2212     Updated: 11/14/24 2217    Narrative:      MRI BRAIN WO CONTRAST    Date of Exam: 11/14/2024 9:50 PM EST    Indication: AMS, left side weakness.     Comparison: 2/8/2018. CT perfusion 11/14/2024    Technique:  Routine multiplanar/multisequence sequence images of the brain were obtained without contrast administration.      Findings:  No acute infarct or abnormal restricted diffusion.    There is no evidence of hemorrhage.  No mass effect, edema or midline shift    Mild periventricular and subcortical white matter T2/FLAIR hyperintensities, nonspecific but most likely represents chronic small vessel ischemic changes.    No extra-axial fluid collection.      The ventricles are normal in size and configuration.  Midline structures are unremarkable.  The visualized flow voids are intact.    The visualized orbits are unremarkable.  Trace right mastoid effusions. Otherwise the paranasal sinuses and  mastoid air cells are clear.    The visualized soft tissues are unremarkable.  No acute osseous abnormality.      Impression:      Impression:  No acute intracranial abnormality.    Scattered periventricular and subcortical T2/FLAIR hyperintensities are nonspecific but most likely represents chronic small vessel ischemic changes.        Electronically Signed: Adama DO Vianey    11/14/2024 10:15 PM EST    Workstation ID: [id number]    CT Angiogram Head w AI Analysis of LVO [[id number]] Collected: 11/14/24 1617     Updated: 11/14/24 1627    Narrative:      CT ANGIOGRAM HEAD W AI ANALYSIS OF LVO, CT ANGIOGRAM NECK    Date of Exam: 11/14/2024 3:15 PM EST    Indication: Stroke, follow up  Neuro deficit, acute, stroke suspected  Acute Stroke.    Comparison: None available.    Technique: CTA of the head was performed after the uneventful intravenous administration of iodinated contrast. Reconstructed coronal and sagittal images were also obtained. In addition, a 3-D volume rendered image was created for interpretation.   Automated exposure control and iterative reconstruction methods were used.    FINDINGS:    Vascular Findings:    The right common carotid, internal carotid, middle cerebral, anterior cerebral, vertebral, and posterior cerebral arteries are patent without abrupt cut off or aneurysmal dilation.    The left common carotid, internal carotid, middle cerebral, anterior cerebral, vertebral, and posterior cerebral arteries are patent without abrupt cut off or aneurysmal dilation.    Basilar artery appears patent and appears unremarkable.    Non-vascular Findings:    For description of nonvascular intracranial findings, please refer to the noncontrast head CT performed the same date.    No acute abnormality is identified within the visualized soft tissue or bony structures of the neck.    The visualized lung apices are clear.      Impression:      1.No acute abnormality is identified within the large arteries of  the head or neck.  2.No significant stenosis of the bilateral internal carotid arteries.          Electronically Signed: Seth Kapoor MD    11/14/2024 4:24 PM EST    Workstation ID: [id number]    CT Angiogram Neck [[id number]] Collected: 11/14/24 1617     Updated: 11/14/24 1627    Narrative:      CT ANGIOGRAM HEAD W AI ANALYSIS OF LVO, CT ANGIOGRAM NECK    Date of Exam: 11/14/2024 3:15 PM EST    Indication: Stroke, follow up  Neuro deficit, acute, stroke suspected  Acute Stroke.    Comparison: None available.    Technique: CTA of the head was performed after the uneventful intravenous administration of iodinated contrast. Reconstructed coronal and sagittal images were also obtained. In addition, a 3-D volume rendered image was created for interpretation.   Automated exposure control and iterative reconstruction methods were used.    FINDINGS:    Vascular Findings:    The right common carotid, internal carotid, middle cerebral, anterior cerebral, vertebral, and posterior cerebral arteries are patent without abrupt cut off or aneurysmal dilation.    The left common carotid, internal carotid, middle cerebral, anterior cerebral, vertebral, and posterior cerebral arteries are patent without abrupt cut off or aneurysmal dilation.    Basilar artery appears patent and appears unremarkable.    Non-vascular Findings:    For description of nonvascular intracranial findings, please refer to the noncontrast head CT performed the same date.    No acute abnormality is identified within the visualized soft tissue or bony structures of the neck.    The visualized lung apices are clear.      Impression:      1.No acute abnormality is identified within the large arteries of the head or neck.  2.No significant stenosis of the bilateral internal carotid arteries.          Electronically Signed: Seth Kapoor MD    11/14/2024 4:24 PM EST    Workstation ID: [id number]    XR Chest 1 View [[id number]] Collected: 11/14/24 1620     Updated:  11/14/24 1623    Narrative:      XR CHEST 1 VW    Date of Exam: 11/14/2024 4:00 PM EST    Indication: Acute Stroke Protocol (onset < 12 hrs)    Comparison: Patient's prior chest x-ray from 2/7/2018 is not available for correlation at the time of this dictation    Findings:  No acute airspace disease. Heart size is normal. No pleural effusion or pneumothorax or acute osseous abnormality. Probable small bone island in the proximal right humerus      Impression:      Impression:  No acute chest finding      Electronically Signed: Nereida Erwin MD    11/14/2024 4:21 PM EST    Workstation ID: [id number]    CT CEREBRAL PERFUSION WITH & WITHOUT CONTRAST [[id number]] Collected: 11/14/24 1531     Updated: 11/14/24 1534    Narrative:      CT CEREBRAL PERFUSION W WO CONTRAST    Date of Exam: 11/14/2024 3:15 PM EST    Indication: Neuro deficit, acute, stroke suspected  Neuro deficit, acute, stroke suspected.     Comparison: CT head without contrast 4/18/2024.    Technique: Axial CT images of the brain were obtained prior to and after the administration of iodinated contrast. CT Perfusion protocol was utilized. Automated post processing was performed by RAPID software and submitted to PACS for interpretation.   Automated exposure control and iterative reconstruction was utilized.      Findings:  No hypoperfusion defect or core ischemic insult is identified.  Cerebral blood flow less than 30%: 0 male  Tmax greater than 6 seconds: 0 mL  Mismatch volume: 0 mL  Mismatch ratio: None      Impression:      Impression:  Normal, negative CT cerebral perfusion study.        Electronically Signed: Nereida Erwin MD    11/14/2024 3:32 PM EST    Workstation ID: [id number]    CT Head Without Contrast Stroke Protocol [[id number]] Collected: 11/14/24 1512     Updated: 11/14/24 1522    Narrative:      CT HEAD WO CONTRAST STROKE PROTOCOL    Date of Exam: 11/14/2024 3:08 PM EST    Indication: Neuro deficit, acute, stroke suspected  Neuro deficit,  acute, stroke suspected. Left-sided weakness    Comparison: Head CT 4/18/2023    Technique: Axial CT images were obtained of the head without contrast administration.  Reconstructed coronal and sagittal images were also obtained. Automated exposure control and iterative construction methods were used.    Scan Time: 3:09 p.m.  Results discussed with Dr. Choi at 3:19 p.m.      Findings:  No acute intracranial hemorrhage.Intact appearing gray-white differentiation.No extra-axial fluid collection.No significant mass effect.    No hydrocephalus.    Brain volume appears age-appropriate.    Scattered areas of periventricular and subcortical white matter hypoattenuation, nonspecific, perhaps from small vessel ischemic/hypertensive changes in a patient of this age.There are intracranial atherosclerotic calcifications.    Mild scattered mucosal thickening in the paranasal sinuses.Mastoid air cells are essentially clear.Included globes and orbits appear unremarkable by CT.    No acute or aggressive appearing bony or extracranial soft tissue process.      Impression:      Impression:  No acute intracranial finding.        Electronically Signed: Charly Starkey    11/14/2024 3:20 PM EST    Workstation ID: [id number]              Assessment & Plan        This is a 54 y.o. male with:    Active and Resolved Problems  Active Hospital Problems    Diagnosis  POA    **Temporary weakness of extremity [R29.898]  Yes     Priority: High    AMS (altered mental status) [R41.82]  Yes     Priority: High    Hypophosphatemia [E83.39]  Yes     Priority: Medium    Acute UTI (urinary tract infection) [N39.0]  Yes     Priority: Medium    Marijuana use [F12.90]  Yes    Tobacco use [Z72.0]  Yes      Resolved Hospital Problems   No resolved problems to display.       Temporary left-sided weakness and altered mental status improved after Narcan now at baseline CT head CTA head and neck CT perfusion MRI brain negative per radiology for acute, being  followed by neurology, aspirin and statin added, stroke orders in place as a precaution, 2D echo ordered and a.m. given syncope EEG and seizure precautions in place given past medical history of seizures not on antiepileptic drugs    Hypophosphatemia phosphorus 1.8, replacement in place    Acute UTI 4+ bacteria positive leukocytes WBC urine elevated, continue 2 g IV ceftriaxone with urine culture pending normal saline at 100 cc/h    Marijuana use noted    Tobacco use, encourage cessation 21 mg nicotine patch ordered    VTE Prophylaxis:  Mechanical VTE prophylaxis orders are present.        The patient desires to be as follows:    CODE STATUS:    Code Status (Patient has no pulse and is not breathing): CPR (Attempt to Resuscitate)  Medical Interventions (Patient has pulse or is breathing): Full Support          Admission Status:  I believe this patient meets inpatient  status.    Expected Length of Stay: pending clinical course    PDMP and Medication Dispenses via Sidebar reviewed and consistent with patient reported medications.    I discussed the patient's findings and my recommendations with patient and family.      Signature:     This document has been electronically signed by SHANNAN Johnson on November 14, 2024 23:33 [facility] Hospitalist Team

## 2024-11-15 NOTE — PLAN OF CARE
Pt A&OX4. Pt reporting L eye watering and mild tingling, however pt tracking WFL and no other visual deficits noticed. Pt with not L shoulder pain, however appears to have been occurring for a couple weeks now. Pt able to get out of bed and bathe himself without assistance. OT recommending pt go to OP hand therapy for his shoulder pain. No further IP OT warranted.

## 2024-11-16 LAB
BACTERIA SPEC AEROBE CULT: NORMAL
GLUCOSE BLDC GLUCOMTR-MCNC: 110 MG/DL (ref 70–105)

## 2024-11-16 PROCEDURE — 82948 REAGENT STRIP/BLOOD GLUCOSE: CPT

## 2024-11-16 PROCEDURE — 97162 PT EVAL MOD COMPLEX 30 MIN: CPT

## 2024-11-16 PROCEDURE — G0378 HOSPITAL OBSERVATION PER HR: HCPCS

## 2024-11-16 PROCEDURE — 96365 THER/PROPH/DIAG IV INF INIT: CPT

## 2024-11-16 PROCEDURE — 25010000002 CEFTRIAXONE PER 250 MG: Performed by: INTERNAL MEDICINE

## 2024-11-16 PROCEDURE — 95819 EEG AWAKE AND ASLEEP: CPT | Performed by: PSYCHIATRY & NEUROLOGY

## 2024-11-16 RX ADMIN — Medication 10 ML: at 08:36

## 2024-11-16 RX ADMIN — ASPIRIN 325 MG ORAL TABLET 325 MG: 325 PILL ORAL at 08:36

## 2024-11-16 RX ADMIN — CEFTRIAXONE 2000 MG: 2 INJECTION, POWDER, FOR SOLUTION INTRAMUSCULAR; INTRAVENOUS at 20:50

## 2024-11-16 RX ADMIN — NICOTINE 1 PATCH: 21 PATCH TRANSDERMAL at 08:39

## 2024-11-16 RX ADMIN — ATORVASTATIN CALCIUM 20 MG: 20 TABLET, FILM COATED ORAL at 20:54

## 2024-11-16 RX ADMIN — CARBAMAZEPINE 200 MG: 200 TABLET ORAL at 20:54

## 2024-11-16 RX ADMIN — ESCITALOPRAM OXALATE 10 MG: 10 TABLET ORAL at 08:36

## 2024-11-16 RX ADMIN — CARBAMAZEPINE 200 MG: 200 TABLET ORAL at 10:40

## 2024-11-16 RX ADMIN — Medication 10 ML: at 20:55

## 2024-11-16 NOTE — PLAN OF CARE
Problem: Adult Inpatient Plan of Care  Goal: Plan of Care Review  Outcome: Progressing  Goal: Patient-Specific Goal (Individualized)  Outcome: Progressing  Goal: Absence of Hospital-Acquired Illness or Injury  Outcome: Progressing  Intervention: Identify and Manage Fall Risk  Recent Flowsheet Documentation  Taken 11/16/2024 1600 by Bhavana Whitmore LPN  Safety Promotion/Fall Prevention: safety round/check completed  Taken 11/16/2024 1400 by Bhavana Whitmore LPN  Safety Promotion/Fall Prevention: safety round/check completed  Taken 11/16/2024 1215 by Bhavana Whitmore LPN  Safety Promotion/Fall Prevention: safety round/check completed  Taken 11/16/2024 1000 by Bhavana Whitmore LPN  Safety Promotion/Fall Prevention: safety round/check completed  Taken 11/16/2024 0830 by Bhavana Whitmore LPN  Safety Promotion/Fall Prevention: safety round/check completed  Intervention: Prevent Infection  Recent Flowsheet Documentation  Taken 11/16/2024 1600 by Bhavana Whitmore LPN  Infection Prevention:   hand hygiene promoted   rest/sleep promoted   single patient room provided  Taken 11/16/2024 1400 by Bhavana Whitmore LPN  Infection Prevention:   hand hygiene promoted   single patient room provided   rest/sleep promoted  Taken 11/16/2024 1215 by Bhavana Whitmore LPN  Infection Prevention:   hand hygiene promoted   rest/sleep promoted   single patient room provided  Taken 11/16/2024 1000 by Bhavana Whitmore LPN  Infection Prevention:   hand hygiene promoted   rest/sleep promoted   single patient room provided  Taken 11/16/2024 0830 by Bhavana Whitmore LPN  Infection Prevention:   hand hygiene promoted   rest/sleep promoted   single patient room provided  Goal: Optimal Comfort and Wellbeing  Outcome: Progressing  Intervention: Provide Person-Centered Care  Recent Flowsheet Documentation  Taken 11/16/2024 1600 by Bhavana Whitmore LPN  Trust Relationship/Rapport:   care explained   choices provided  Taken 11/16/2024 1215 by Bhavana Whitmore LPN  Trust  Relationship/Rapport:   care explained   choices provided  Taken 11/16/2024 7428 by Bhavana Whitmore LPN  Trust Relationship/Rapport: care explained  Goal: Readiness for Transition of Care  Outcome: Progressing   Goal Outcome Evaluation:

## 2024-11-16 NOTE — PLAN OF CARE
Patient's strength continues to improve.  Patient able to ambulate to the bathroom okay this morning.  Patient a little drowsy due to new medications although adjustment period should only last 1 to 2 weeks before improvement .  Referral sent to outpatient neurology team and will hopefully be seen within the next 1-2 months.

## 2024-11-16 NOTE — PLAN OF CARE
Goal Outcome Evaluation:  Plan of Care Reviewed With: patient        Progress: improving  Outcome Evaluation: Patient complained of headache during the night. Provider on call notified. Treatment order was received and same was carried out. Treatment was effective per patient.

## 2024-11-16 NOTE — PROGRESS NOTES
.    [facility] MEDICINE SERVICE  DAILY PROGRESS NOTE    NAME: Juaquin Pena  : 1970  MRN: [id number]      LOS: 0 days     PROVIDER OF SERVICE: Shaylee Silva MD    Chief Complaint: Temporary weakness of extremity    Subjective:     Interval History:  History taken from: patient    Seen and examined at bedside, sleeping comfortably.  Woke up and tell me his name but wife present at bedside notes that he was up most of the night and he had just gone to sleep.  Wife also present.  Patient strength is improving slowly.        Review of Systems:   Review of Systems negative except as mentioned above    Objective:     Vital Signs  Temp:  [97 °F (36.1 °C)-98.7 °F (37.1 °C)] 97.6 °F (36.4 °C)  Heart Rate:  [55-77] 55  Resp:  [12-16] 14  BP: ()/(47-86) 118/75   Body mass index is 25 kg/m².    Physical Exam  Physical Exam  Constitutional:       Appearance: Normal appearance.   HENT:      Head: Normocephalic and atraumatic.      Nose: Nose normal.      Mouth/Throat:      Mouth: Mucous membranes are moist.   Eyes:      Extraocular Movements: Extraocular movements intact.      Conjunctiva/sclera: Conjunctivae normal.      Pupils: Pupils are equal, round, and reactive to light.   Cardiovascular:      Rate and Rhythm: Normal rate and regular rhythm.      Pulses: Normal pulses.      Heart sounds: Normal heart sounds.   Pulmonary:      Effort: Pulmonary effort is normal.      Breath sounds: Normal breath sounds.   Abdominal:      General: Abdomen is flat. Bowel sounds are normal.      Palpations: Abdomen is soft.   Musculoskeletal:         General: Normal range of motion.  Weakness on left side     Cervical back: Normal range of motion and neck supple.   Skin:     General: Skin is warm and dry.      Capillary Refill: Capillary refill takes less than 2 seconds.   Neurological:      General: No focal deficit present.      Mental Status: He is alert and oriented to person, place, and time.   Psychiatric:          Mood and Affect: Mood normal.         Behavior: Behavior normal.         Thought Content: Thought content normal.         Judgment: Judgment normal.      Diagnostic Data    Results from last 7 days   Lab Units 11/15/24  2239 11/15/24  0103 11/14/24  1538   WBC 10*3/mm3 9.89   < > 11.45*   HEMOGLOBIN g/dL 13.3   < > 14.1   HEMATOCRIT % 40.5   < > 43.0   PLATELETS 10*3/mm3 398   < > 351   GLUCOSE mg/dL 96   < > 101*   CREATININE mg/dL 1.16   < > 1.11   BUN mg/dL 11   < > 12   SODIUM mmol/L 140   < > 139   POTASSIUM mmol/L 4.0   < > 4.2   AST (SGOT) U/L  --   --  37   ALT (SGPT) U/L  --   --  18   ALK PHOS U/L  --   --  101   BILIRUBIN mg/dL  --   --  0.7   ANION GAP mmol/L 9.7   < > 9.7    < > = values in this interval not displayed.       MRI Brain Without Contrast    Result Date: 11/14/2024  Impression: No acute intracranial abnormality. Scattered periventricular and subcortical T2/FLAIR hyperintensities are nonspecific but most likely represents chronic small vessel ischemic changes. Electronically Signed: Adama Winters DO  11/14/2024 10:15 PM EST  Workstation ID: [id number]    CT Angiogram Head w AI Analysis of LVO    Result Date: 11/14/2024  1.No acute abnormality is identified within the large arteries of the head or neck. 2.No significant stenosis of the bilateral internal carotid arteries. Electronically Signed: Seth Kapoor MD  11/14/2024 4:24 PM EST  Workstation ID: [id number]    CT Angiogram Neck    Result Date: 11/14/2024  1.No acute abnormality is identified within the large arteries of the head or neck. 2.No significant stenosis of the bilateral internal carotid arteries. Electronically Signed: Seth Kapoor MD  11/14/2024 4:24 PM EST  Workstation ID: [id number]    XR Chest 1 View    Result Date: 11/14/2024  Impression: No acute chest finding Electronically Signed: Nereida Erwin MD  11/14/2024 4:21 PM EST  Workstation ID: [id number]    CT CEREBRAL PERFUSION WITH & WITHOUT CONTRAST    Result Date:  11/14/2024  Impression: Normal, negative CT cerebral perfusion study. Electronically Signed: Nereida Erwin MD  11/14/2024 3:32 PM EST  Workstation ID: [id number]    CT Head Without Contrast Stroke Protocol    Result Date: 11/14/2024  Impression: No acute intracranial finding. Electronically Signed: Charly Starkey  11/14/2024 3:20 PM EST  Workstation ID: [id number]       I reviewed the patient's new clinical results.    Assessment/Plan:     Active and Resolved Problems  Active Hospital Problems    Diagnosis  POA    **Temporary weakness of extremity [R29.898]  Yes    AMS (altered mental status) [R41.82]  Yes    Hypophosphatemia [E83.39]  Yes    Marijuana use [F12.90]  Yes    Acute UTI (urinary tract infection) [N39.0]  Yes    Tobacco use [Z72.0]  Yes      Resolved Hospital Problems   No resolved problems to display.       Temporary left-sided weakness and altered mental status   -MRI brain showed no acute intracranial process.  Altered mental status is completely resolved.  EEG interpreted to be abnormal.  - Neurology following, recommend outpatient follow-up.     Hypophosphatemia   - Replace phosphorus     Acute UTI ruled out  - S/p ceftriaxone   -Urine culture reviewed which showed no growth.       VTE Prophylaxis:  Mechanical VTE prophylaxis orders are present.             Disposition Planning:     Barriers to Discharge: Ongoing care  Anticipated Date of Discharge: 11/17  Place of Discharge: Home      Time: > 40 minutes     Code Status and Medical Interventions: CPR (Attempt to Resuscitate); Full Support   Ordered at: 11/14/24 2124     Code Status (Patient has no pulse and is not breathing):    CPR (Attempt to Resuscitate)     Medical Interventions (Patient has pulse or is breathing):    Full Support       Signature: Electronically signed by Shaylee Silva MD, 11/16/24, 12:29 EST.  [facility] Hospitalist Team

## 2024-11-16 NOTE — PLAN OF CARE
Goal Outcome Evaluation:  Plan of Care Reviewed With: patient, spouse           Outcome Evaluation: Pt is a 53 YO M admitted with syncope and collapse while at work. Reports then experiencing n/t in L side of body and face. MRI shows no acute abnormality, but did have an abnormal EEG with neurology this date. Pt reports symptoms have improved, but c/o headache in sitting and L sided sensation deficits. In standing pt with multiple minor michele in L knee, required w/c follow for ambulation. Pt ambulated for approx 20 feet with IESHA for balance assistance. Pt generally is independent with ADLs, ambulation without AD and drives/works. Pt is well below baseline and recommendation is IP rehab at d/c. PT to follow.    Anticipated Discharge Disposition (PT): inpatient rehabilitation facility

## 2024-11-16 NOTE — PROCEDURES
This is an inpatient,   Digitally recorded multi-montage EEG with leads placed according to the international 10/20 system  Photic stimulation was done  Hyperventilation was done    With the patient fully aroused, the background was about 8.5 Hz posterior dominant alpha rhythm which was symmetric and attenuated with eyes opening  The patient did fall asleep  Mostly asleep with spindles  No asymmetry    Throughout the record there was some phase reversing, most commonly between C4 and P4 and then some between C3 and P3 and occasionally between F7 and T3  No clinical events  Hyperventilation was attempted but I did not see any significant changes  Photic stimulation was attempted in intermittent stepwise pattern up to the flash frequencies of 30 Hz but I did not see any driving, asymmetry or paroxysmal activity    Impression:    This is an abnormal adult awake, drowsy and asleep EEG.  Abnormalities presence of irritable foci mostly in the central parietal region on the right side but they were present in other areas also, independently

## 2024-11-16 NOTE — THERAPY EVALUATION
Patient Name: Juaquin Pena  : 1970    MRN: [id number]                              Today's Date: 2024       Admit Date: 2024    Visit Dx:     ICD-10-CM ICD-9-CM   1. Syncope and collapse  R55 780.2   2. Acute left-sided weakness  R53.1 728.87   3. History of seizure  Z87.898 V13.89   4. Acute urinary tract infection  N39.0 599.0   5. Marijuana use  F12.90 305.20   6. Seizure  R56.9 780.39     Patient Active Problem List   Diagnosis    Temporary weakness of extremity    AMS (altered mental status)    Hypophosphatemia    Marijuana use    Acute UTI (urinary tract infection)    Tobacco use     Past Medical History:   Diagnosis Date    Arthritis     Low back pain     Seizure     Stroke (cerebrum)          Past Surgical History:   Procedure Laterality Date    FINGER SURGERY      rt  little finger cut off     HERNIA REPAIR        General Information       Row Name 24 1734          Physical Therapy Time and Intention    Document Type evaluation  -EL     Mode of Treatment individual therapy;physical therapy  -       Row Name 24 1734          General Information    Prior Level of Function independent:;all household mobility;ADL's;work  -       Row Name 24 1734          Living Environment    People in Home spouse  -       Row Name 24 1734          Cognition    Orientation Status (Cognition) oriented x 4;verbal cues/prompts needed for orientation  -       Row Name 24 1734          Safety Issues/Impairments Affecting Functional Mobility    Impairments Affecting Function (Mobility) balance;cognition;endurance/activity tolerance;strength  -EL     Cognitive Impairments, Mobility Safety/Performance judgment;insight into deficits/self-awareness;problem-solving/reasoning  -EL               User Key  (r) = Recorded By, (t) = Taken By, (c) = Cosigned By      Initials Name Provider Type    EL Rogerio Perkins PT Physical Therapist                   Mobility       Row Name  11/16/24 1739          Bed Mobility    Bed Mobility sit-supine;supine-sit  -EL     Supine-Sit [place] (Bed Mobility) standby assist  -EL     Sit-Supine [place] (Bed Mobility) standby assist  -EL       Row Name 11/16/24 1739          Sit-Stand Transfer    Sit-Stand [place] (Transfers) contact guard  -EL     Assistive Device (Sit-Stand Transfers) walker, front-wheeled  -EL       Row Name 11/16/24 1739          Gait/Stairs (Locomotion)    [place] Level (Gait) minimum assist (75% patient effort)  -EL     Assistive Device (Gait) walker, front-wheeled  -EL     Patient was able to Ambulate yes  -EL     Distance in Feet (Gait) 20  -EL     Deviations/Abnormal Patterns (Gait) gait speed decreased  -EL     Left Sided Gait Deviations knee buckling, left side  -EL     Comment, (Gait/Stairs) Multiple minor buckling instances, requiring a w/c follow back to bed  -EL               User Key  (r) = Recorded By, (t) = Taken By, (c) = Cosigned By      Initials Name Provider Type    EL Rogerio Perkins PT Physical Therapist                   Obj/Interventions       Row Name 11/16/24 1745          Range of Motion Comprehensive    General Range of Motion bilateral lower extremity ROM WFL  -EL       Row Name 11/16/24 1745          Strength Comprehensive (MMT)    General Manual Muscle Testing (MMT) Assessment lower extremity strength deficits identified  -EL     Comment, General Manual Muscle Testing (MMT) Assessment LLE 3/5 gross, RLE 4-/5 gross  -EL       Row Name 11/16/24 1745          Balance    Balance Assessment sitting static balance;standing static balance;standing dynamic balance  -EL     Static Sitting Balance independent  -EL     Static Standing Balance contact guard  -EL     Dynamic Standing Balance contact guard  -EL       Row Name 11/16/24 1745          Sensory Assessment (Somatosensory)    Left LE Sensory Assessment general sensation;impaired  -EL               User Key  (r) = Recorded By, (t) = Taken By, (c) =  Cosigned By      Initials Name Provider Type    Rogerio Conde, PT Physical Therapist                   Goals/Plan       Row Name 11/16/24 1803          Bed Mobility Goal 1 (PT)    Activity/Assistive Device (Bed Mobility Goal 1, PT) bed mobility activities, all  -EL     [place] Level/Cues Needed (Bed Mobility Goal 1, PT) modified independence  -EL     Time Frame (Bed Mobility Goal 1, PT) long term goal (LTG);2 weeks  -EL       Row Name 11/16/24 1803          Transfer Goal 1 (PT)    Activity/Assistive Device (Transfer Goal 1, PT) transfers, all;walker, rolling  -EL     [place] Level/Cues Needed (Transfer Goal 1, PT) modified independence  -EL     Time Frame (Transfer Goal 1, PT) long term goal (LTG);2 weeks  -EL       Row Name 11/16/24 1803          Gait Training Goal 1 (PT)    Activity/Assistive Device (Gait Training Goal 1, PT) gait (walking locomotion);walker, rolling  -EL     [place] Level (Gait Training Goal 1, PT) modified independence  -EL     Distance (Gait Training Goal 1, PT) 150  -EL     Time Frame (Gait Training Goal 1, PT) long term goal (LTG);2 weeks  -EL       Row Name 11/16/24 1803          Therapy Assessment/Plan (PT)    Planned Therapy Interventions (PT) balance training;neuromuscular re-education;bed mobility training;transfer training;gait training;patient/family education;strengthening  -EL               User Key  (r) = Recorded By, (t) = Taken By, (c) = Cosigned By      Initials Name Provider Type    Rogerio Conde, PT Physical Therapist                   Clinical Impression       Row Name 11/16/24 1746          Pain    Pretreatment Pain Rating 3/10  -EL     Posttreatment Pain Rating 3/10  -EL     Pain Location chest  -EL       Row Name 11/16/24 1746          Plan of Care Review    Plan of Care Reviewed With patient;spouse  -EL     Outcome Evaluation Pt is a 55 YO M admitted with syncope and collapse while at work. Reports then experiencing n/t in L side of body and face. MRI shows  no acute abnormality, but did have an abnormal EEG with neurology this date. Pt reports symptoms have improved, but c/o headache in sitting and L sided sensation deficits. In standing pt with multiple minor michele in L knee, required w/c follow for ambulation. Pt ambulated for approx 20 feet with IESHA for balance assistance. Pt generally is independent with ADLs, ambulation without AD and drives/works. Pt is well below baseline and recommendation is IP rehab at d/c. PT to follow.  -EL       Row Name 11/16/24 4346          Therapy Assessment/Plan (PT)    Rehab Potential (PT) good  -EL     Criteria for Skilled Interventions Met (PT) yes  -EL     Therapy Frequency (PT) 5 times/wk  -EL     Predicted Duration of Therapy Intervention (PT) Until d/c  -EL       Row Name 11/16/24 1746          Vital Signs    O2 Delivery Pre Treatment room air  -EL     O2 Delivery Intra Treatment room air  -EL     O2 Delivery Post Treatment room air  -EL     Pre Patient Position Supine  -EL     Intra Patient Position Standing  -EL     Post Patient Position Sitting  -EL       Row Name 11/16/24 1816          Positioning and Restraints    Pre-Treatment Position in bed  -EL     Post Treatment Position chair  -EL     In Chair notified nsg;reclined;call light within reach;encouraged to call for assist;exit alarm on  -EL               User Key  (r) = Recorded By, (t) = Taken By, (c) = Cosigned By      Initials Name Provider Type    Rogerio Conde, PT Physical Therapist                   Outcome Measures       Row Name 11/16/24 0896          How much help from another person do you currently need...    Turning from your back to your side while in flat bed without using bedrails? 4  -EL     Moving from lying on back to sitting on the side of a flat bed without bedrails? 3  -EL     Moving to and from a bed to a chair (including a wheelchair)? 3  -EL     Standing up from a chair using your arms (e.g., wheelchair, bedside chair)? 3  -EL     Climbing 3-5  steps with a railing? 2  -EL     To walk in hospital room? 3  -EL     AM-PAC 6 Clicks Score (PT) 18  -EL     Highest Level of Mobility Goal 6 --> Walk 10 steps or more  -       Row Name 11/16/24 1807          Modified [place] Scale    Pre-Stroke Modified Krystin Scale 6 - Unable to determine (UTD) from the medical record documentation  -     Modified [place] Scale 4 - Moderately severe disability.  Unable to walk without assistance, and unable to attend to own bodily needs without assistance.  -       Row Name 11/16/24 1807          Functional Assessment    Outcome Measure Options Modified [place];AM-PAC 6 Clicks Basic Mobility (PT)  -               User Key  (r) = Recorded By, (t) = Taken By, (c) = Cosigned By      Initials Name Provider Type    Rogerio Conde PT Physical Therapist                                 Physical Therapy Education       Title: PT OT SLP Therapies (Done)       Topic: Physical Therapy (Done)       Point: Mobility training (Done)       Learning Progress Summary            Patient Acceptance, E,TB, VU by  at 11/16/2024 1808                      Point: Precautions (Done)       Learning Progress Summary            Patient Acceptance, E,TB, VU by  at 11/16/2024 1808                                      User Key       Initials Effective Dates Name Provider Type Discipline     06/23/20 -  Rogerio Perkins, PT Physical Therapist PT                  PT Recommendation and Plan  Planned Therapy Interventions (PT): balance training, neuromuscular re-education, bed mobility training, transfer training, gait training, patient/family education, strengthening  Outcome Evaluation: Pt is a 53 YO M admitted with syncope and collapse while at work. Reports then experiencing n/t in L side of body and face. MRI shows no acute abnormality, but did have an abnormal EEG with neurology this date. Pt reports symptoms have improved, but c/o headache in sitting and L sided sensation deficits. In standing pt with  multiple minor michele in L knee, required w/c follow for ambulation. Pt ambulated for approx 20 feet with IESHA for balance assistance. Pt generally is independent with ADLs, ambulation without AD and drives/works. Pt is well below baseline and recommendation is IP rehab at d/c. PT to follow.     Time Calculation:   PT Evaluation Complexity  History, PT Evaluation Complexity: 1-2 personal factors and/or comorbidities  Examination of Body Systems (PT Eval Complexity): total of 3 or more elements  Clinical Presentation (PT Evaluation Complexity): evolving  Clinical Decision Making (PT Evaluation Complexity): moderate complexity  Overall Complexity (PT Evaluation Complexity): moderate complexity     PT Charges       Row Name 11/16/24 1809             Time Calculation    Start Time 1608  -EL      Stop Time 1630  -EL      Time Calculation (min) 22 min  -EL      PT Received On 11/16/24  -EL      PT - Next Appointment 11/18/24  -EL      PT Goal Re-Cert Due Date 11/30/24  -EL                User Key  (r) = Recorded By, (t) = Taken By, (c) = Cosigned By      Initials Name Provider Type    Rogerio Conde, PT Physical Therapist                  Therapy Charges for Today       Code Description Service Date Service Provider Modifiers Qty    [id number]  PT EVAL MOD COMPLEXITY 4 11/16/2024 Rogerio Perkins, PT GP 1            PT G-Codes  Outcome Measure Options: Modified [place], AM-PAC 6 Clicks Basic Mobility (PT)  AM-PAC 6 Clicks Score (PT): 18  Modified Krystin Scale: 4 - Moderately severe disability.  Unable to walk without assistance, and unable to attend to own bodily needs without assistance.  PT Discharge Summary  Anticipated Discharge Disposition (PT): inpatient rehabilitation facility    Rogerio Perkins PT  11/16/2024

## 2024-11-17 LAB
ANION GAP SERPL CALCULATED.3IONS-SCNC: 9.1 MMOL/L (ref 5–15)
BASOPHILS # BLD AUTO: 0.06 10*3/MM3 (ref 0–0.2)
BASOPHILS NFR BLD AUTO: 0.7 % (ref 0–1.5)
BUN SERPL-MCNC: 12 MG/DL (ref 6–20)
BUN/CREAT SERPL: 11.8 (ref 7–25)
CALCIUM SPEC-SCNC: 8.7 MG/DL (ref 8.6–10.5)
CHLORIDE SERPL-SCNC: 103 MMOL/L (ref 98–107)
CO2 SERPL-SCNC: 25.9 MMOL/L (ref 22–29)
CREAT SERPL-MCNC: 1.02 MG/DL (ref 0.76–1.27)
DEPRECATED RDW RBC AUTO: 43.8 FL (ref 37–54)
EGFRCR SERPLBLD CKD-EPI 2021: 87.3 ML/MIN/1.73
EOSINOPHIL # BLD AUTO: 0.14 10*3/MM3 (ref 0–0.4)
EOSINOPHIL NFR BLD AUTO: 1.6 % (ref 0.3–6.2)
ERYTHROCYTE [DISTWIDTH] IN BLOOD BY AUTOMATED COUNT: 12.8 % (ref 12.3–15.4)
GLUCOSE SERPL-MCNC: 95 MG/DL (ref 65–99)
HCT VFR BLD AUTO: 38.4 % (ref 37.5–51)
HGB BLD-MCNC: 12.9 G/DL (ref 13–17.7)
IMM GRANULOCYTES # BLD AUTO: 0.02 10*3/MM3 (ref 0–0.05)
IMM GRANULOCYTES NFR BLD AUTO: 0.2 % (ref 0–0.5)
LYMPHOCYTES # BLD AUTO: 2.8 10*3/MM3 (ref 0.7–3.1)
LYMPHOCYTES NFR BLD AUTO: 31.3 % (ref 19.6–45.3)
MCH RBC QN AUTO: 31.2 PG (ref 26.6–33)
MCHC RBC AUTO-ENTMCNC: 33.6 G/DL (ref 31.5–35.7)
MCV RBC AUTO: 92.8 FL (ref 79–97)
MONOCYTES # BLD AUTO: 0.86 10*3/MM3 (ref 0.1–0.9)
MONOCYTES NFR BLD AUTO: 9.6 % (ref 5–12)
NEUTROPHILS NFR BLD AUTO: 5.07 10*3/MM3 (ref 1.7–7)
NEUTROPHILS NFR BLD AUTO: 56.6 % (ref 42.7–76)
NRBC BLD AUTO-RTO: 0 /100 WBC (ref 0–0.2)
PLATELET # BLD AUTO: 384 10*3/MM3 (ref 140–450)
PMV BLD AUTO: 10.2 FL (ref 6–12)
POTASSIUM SERPL-SCNC: 4.1 MMOL/L (ref 3.5–5.2)
RBC # BLD AUTO: 4.14 10*6/MM3 (ref 4.14–5.8)
SODIUM SERPL-SCNC: 138 MMOL/L (ref 136–145)
WBC NRBC COR # BLD AUTO: 8.95 10*3/MM3 (ref 3.4–10.8)

## 2024-11-17 PROCEDURE — 85025 COMPLETE CBC W/AUTO DIFF WBC: CPT | Performed by: NURSE PRACTITIONER

## 2024-11-17 PROCEDURE — 80048 BASIC METABOLIC PNL TOTAL CA: CPT | Performed by: NURSE PRACTITIONER

## 2024-11-17 PROCEDURE — G0378 HOSPITAL OBSERVATION PER HR: HCPCS

## 2024-11-17 RX ORDER — ENOXAPARIN SODIUM 100 MG/ML
40 INJECTION SUBCUTANEOUS DAILY
Status: DISCONTINUED | OUTPATIENT
Start: 2024-11-17 | End: 2024-11-18 | Stop reason: HOSPADM

## 2024-11-17 RX ORDER — ASPIRIN 81 MG/1
81 TABLET ORAL DAILY
Status: DISCONTINUED | OUTPATIENT
Start: 2024-11-18 | End: 2024-11-18 | Stop reason: HOSPADM

## 2024-11-17 RX ADMIN — ASPIRIN 325 MG ORAL TABLET 325 MG: 325 PILL ORAL at 08:34

## 2024-11-17 RX ADMIN — Medication 5 MG: at 02:06

## 2024-11-17 RX ADMIN — Medication 10 ML: at 20:09

## 2024-11-17 RX ADMIN — NICOTINE 1 PATCH: 21 PATCH TRANSDERMAL at 20:01

## 2024-11-17 RX ADMIN — Medication 10 ML: at 08:34

## 2024-11-17 RX ADMIN — ESCITALOPRAM OXALATE 10 MG: 10 TABLET ORAL at 08:34

## 2024-11-17 RX ADMIN — CARBAMAZEPINE 200 MG: 200 TABLET ORAL at 20:00

## 2024-11-17 RX ADMIN — NICOTINE 1 PATCH: 21 PATCH TRANSDERMAL at 08:36

## 2024-11-17 RX ADMIN — ATORVASTATIN CALCIUM 20 MG: 20 TABLET, FILM COATED ORAL at 20:00

## 2024-11-17 RX ADMIN — CARBAMAZEPINE 200 MG: 200 TABLET ORAL at 08:34

## 2024-11-17 NOTE — CASE MANAGEMENT/SOCIAL WORK
Continued Stay Note  [facility]     Patient Name: Juaquin Pena  MRN: [id number]  Today's Date: 11/17/2024    Admit Date: 11/14/2024    Plan: DC Plan: IRF. Facility choice pending. No PASRR required. Will require pre-cert.   Discharge Plan       Row Name 11/17/24 1133       Plan    Plan DC Plan: IRF. Facility choice pending. No PASRR required. Will require pre-cert.    Provided Post Acute Provider List? Yes    Post Acute Provider List Inpatient Rehab    Delivered To Patient;Support Person    Support Person WifeLedy    Method of Delivery In person    Plan Comments PT recommending IRF. CM delivered IRF list to bedside. Patient's wifeLedy also present. CM phone number written on facility list and patient instructed to call with facility choice.             Kassandra Young RN     Office: [phone]  Fax: [phone]

## 2024-11-17 NOTE — PLAN OF CARE
Problem: Adult Inpatient Plan of Care  Goal: Plan of Care Review  Outcome: Progressing  Flowsheets  Taken 11/17/2024 1744 by Eliana Funk RN  Plan of Care Reviewed With: patient  Taken 11/16/2024 0514 by Dorothy Whittaker RN  Progress: improving  Goal: Patient-Specific Goal (Individualized)  Outcome: Progressing  Goal: Absence of Hospital-Acquired Illness or Injury  Outcome: Progressing  Intervention: Identify and Manage Fall Risk  Recent Flowsheet Documentation  Taken 11/17/2024 1600 by Eliana Funk RN  Safety Promotion/Fall Prevention:   safety round/check completed   activity supervised   assistive device/personal items within reach   clutter free environment maintained   fall prevention program maintained   gait belt   lighting adjusted   mobility aid in reach   nonskid shoes/slippers when out of bed   room organization consistent  Taken 11/17/2024 1400 by Eliana Funk RN  Safety Promotion/Fall Prevention:   safety round/check completed   activity supervised   assistive device/personal items within reach   clutter free environment maintained   fall prevention program maintained   gait belt   lighting adjusted   mobility aid in reach   nonskid shoes/slippers when out of bed   room organization consistent  Taken 11/17/2024 1209 by Eliana Funk RN  Safety Promotion/Fall Prevention:   safety round/check completed   activity supervised   assistive device/personal items within reach   clutter free environment maintained   fall prevention program maintained   gait belt   lighting adjusted   mobility aid in reach   nonskid shoes/slippers when out of bed   room organization consistent  Taken 11/17/2024 1000 by Eliana Funk RN  Safety Promotion/Fall Prevention:   safety round/check completed   activity supervised   assistive device/personal items within reach   clutter free environment maintained   fall prevention program maintained   gait belt   lighting adjusted   mobility aid in reach   nonskid shoes/slippers  when out of bed   room organization consistent  Intervention: Prevent Infection  Recent Flowsheet Documentation  Taken 11/17/2024 1600 by Eliana Funk RN  Infection Prevention:   hand hygiene promoted   single patient room provided  Taken 11/17/2024 1400 by Eliana Funk RN  Infection Prevention:   hand hygiene promoted   single patient room provided  Taken 11/17/2024 1209 by Eliana Funk RN  Infection Prevention:   hand hygiene promoted   single patient room provided  Taken 11/17/2024 1000 by Eliana Funk RN  Infection Prevention:   hand hygiene promoted   single patient room provided  Goal: Optimal Comfort and Wellbeing  Outcome: Progressing  Goal: Readiness for Transition of Care  Outcome: Progressing   Goal Outcome Evaluation:  Plan of Care Reviewed With: patient

## 2024-11-17 NOTE — PROGRESS NOTES
.    [facility] MEDICINE SERVICE  DAILY PROGRESS NOTE    NAME: Juaquin Pena  : 1970  MRN: [id number]      LOS: 0 days     PROVIDER OF SERVICE: Shaylee Silva MD    Chief Complaint: Temporary weakness of extremity    Subjective:     Interval History:  History taken from: patient    Seen and examined at bedside, with wife present.  They note that left-sided weakness is increasingly improving.  Patient has been tolerating his diet and ambulated to the restroom this morning.        Review of Systems:   Review of Systems negative except as mentioned above    Objective:     Vital Signs  Temp:  [97.8 °F (36.6 °C)-98.6 °F (37 °C)] 97.9 °F (36.6 °C)  Heart Rate:  [55-69] 68  Resp:  [10-15] 11  BP: (121-144)/(70-91) 144/91   Body mass index is 25 kg/m².    Physical Exam  Physical Exam  Constitutional:       Appearance: Normal appearance.   HENT:      Head: Normocephalic and atraumatic.      Nose: Nose normal.      Mouth/Throat:      Mouth: Mucous membranes are moist.   Eyes:      Extraocular Movements: Extraocular movements intact.      Conjunctiva/sclera: Conjunctivae normal.      Pupils: Pupils are equal, round, and reactive to light.   Cardiovascular:      Rate and Rhythm: Normal rate and regular rhythm.      Pulses: Normal pulses.      Heart sounds: Normal heart sounds.   Pulmonary:      Effort: Pulmonary effort is normal.      Breath sounds: Normal breath sounds.   Abdominal:      General: Abdomen is flat. Bowel sounds are normal.      Palpations: Abdomen is soft.   Musculoskeletal:         General: Normal range of motion.  Weakness on left side     Cervical back: Normal range of motion and neck supple.   Skin:     General: Skin is warm and dry.      Capillary Refill: Capillary refill takes less than 2 seconds.   Neurological:      General: No focal deficit present.      Mental Status: He is alert and oriented to person, place, and time.   Psychiatric:         Mood and Affect: Mood normal.          Behavior: Behavior normal.         Thought Content: Thought content normal.         Judgment: Judgment normal.         Diagnostic Data    Results from last 7 days   Lab Units 11/17/24  0535 11/15/24  0103 11/14/24  1538   WBC 10*3/mm3 8.95   < > 11.45*   HEMOGLOBIN g/dL 12.9*   < > 14.1   HEMATOCRIT % 38.4   < > 43.0   PLATELETS 10*3/mm3 384   < > 351   GLUCOSE mg/dL 95   < > 101*   CREATININE mg/dL 1.02   < > 1.11   BUN mg/dL 12   < > 12   SODIUM mmol/L 138   < > 139   POTASSIUM mmol/L 4.1   < > 4.2   AST (SGOT) U/L  --   --  37   ALT (SGPT) U/L  --   --  18   ALK PHOS U/L  --   --  101   BILIRUBIN mg/dL  --   --  0.7   ANION GAP mmol/L 9.1   < > 9.7    < > = values in this interval not displayed.       No radiology results for the last day      I reviewed the patient's new clinical results.    Assessment/Plan:     Active and Resolved Problems  Active Hospital Problems    Diagnosis  POA    **Temporary weakness of extremity [R29.898]  Yes    AMS (altered mental status) [R41.82]  Yes    Hypophosphatemia [E83.39]  Yes    Marijuana use [F12.90]  Yes    Acute UTI (urinary tract infection) [N39.0]  Yes    Tobacco use [Z72.0]  Yes      Resolved Hospital Problems   No resolved problems to display.       Temporary left-sided weakness and altered mental status   -MRI brain showed no acute intracranial process.  Altered mental status is completely resolved.  EEG interpreted to be abnormal.  - Neurology following, recommend outpatient follow-up.  - PT/OT ordered     Hypophosphatemia   - Replace phosphorus     Acute UTI ruled out  - S/p ceftriaxone   -Urine culture reviewed which showed no growth.        VTE Prophylaxis:  Mechanical VTE prophylaxis orders are present.        Disposition Planning:      Barriers to Discharge: Ongoing care  Anticipated Date of Discharge: 11/18  Place of Discharge: Home        Time: > 40 minutes         Code Status and Medical Interventions: CPR (Attempt to Resuscitate); Full Support   Ordered at:  11/14/24 2124     Code Status (Patient has no pulse and is not breathing):    CPR (Attempt to Resuscitate)     Medical Interventions (Patient has pulse or is breathing):    Full Support       Signature: Electronically signed by Shaylee Silva MD, 11/17/24, 12:01 EST.  [facility] Hospitalist Team

## 2024-11-17 NOTE — PLAN OF CARE
Problem: Adult Inpatient Plan of Care  Goal: Plan of Care Review  Outcome: Progressing  Goal: Patient-Specific Goal (Individualized)  Outcome: Progressing  Goal: Absence of Hospital-Acquired Illness or Injury  Outcome: Progressing  Intervention: Identify and Manage Fall Risk  Recent Flowsheet Documentation  Taken 11/17/2024 0400 by Adele Herndon RN  Safety Promotion/Fall Prevention:   activity supervised   assistive device/personal items within reach   clutter free environment maintained   lighting adjusted   nonskid shoes/slippers when out of bed   room organization consistent   safety round/check completed  Taken 11/17/2024 0200 by Adele Herndon RN  Safety Promotion/Fall Prevention: activity supervised  Taken 11/17/2024 0000 by Adele Herndon RN  Safety Promotion/Fall Prevention:   activity supervised   room organization consistent   safety round/check completed   nonskid shoes/slippers when out of bed   clutter free environment maintained  Taken 11/16/2024 2200 by Adele Herndon RN  Safety Promotion/Fall Prevention:   activity supervised   safety round/check completed   room organization consistent   fall prevention program maintained   elopement precautions   assistive device/personal items within reach  Taken 11/16/2024 2000 by Adele Herndon RN  Safety Promotion/Fall Prevention:   activity supervised   clutter free environment maintained   assistive device/personal items within reach   lighting adjusted   nonskid shoes/slippers when out of bed   room organization consistent   safety round/check completed  Intervention: Prevent Skin Injury  Recent Flowsheet Documentation  Taken 11/17/2024 0400 by Adele Herndon RN  Body Position: position changed independently  Taken 11/17/2024 0000 by Adele Herndon RN  Body Position: position changed independently  Taken 11/16/2024 2000 by Adele Herndon RN  Body Position:   position changed independently   weight shifting  Intervention: Prevent and Manage  VTE (Venous Thromboembolism) Risk  Recent Flowsheet Documentation  Taken 11/17/2024 0400 by Adele Herndon RN  VTE Prevention/Management:   SCDs (sequential compression devices) off   patient refused intervention  Taken 11/17/2024 0000 by Adele Herndon RN  VTE Prevention/Management:   SCDs (sequential compression devices) off   patient refused intervention  Taken 11/16/2024 2000 by Adele Herndon RN  VTE Prevention/Management:   SCDs (sequential compression devices) off   patient refused intervention  Intervention: Prevent Infection  Recent Flowsheet Documentation  Taken 11/17/2024 0400 by Adele Herndon RN  Infection Prevention:   environmental surveillance performed   hand hygiene promoted   rest/sleep promoted   single patient room provided  Taken 11/17/2024 0200 by Adele Herndon RN  Infection Prevention: environmental surveillance performed  Taken 11/17/2024 0000 by Adele Herndon RN  Infection Prevention:   environmental surveillance performed   equipment surfaces disinfected   single patient room provided  Taken 11/16/2024 2200 by Adele Herndon RN  Infection Prevention: environmental surveillance performed  Taken 11/16/2024 2000 by Adele Herndon RN  Infection Prevention: environmental surveillance performed  Goal: Optimal Comfort and Wellbeing  Outcome: Progressing  Intervention: Provide Person-Centered Care  Recent Flowsheet Documentation  Taken 11/17/2024 0400 by Adele Herndon RN  Trust Relationship/Rapport:   care explained   choices provided   emotional support provided   empathic listening provided   questions answered   questions encouraged   reassurance provided   thoughts/feelings acknowledged  Taken 11/16/2024 2000 by Adele Herndon RN  Trust Relationship/Rapport:   care explained   choices provided   emotional support provided   empathic listening provided   questions answered   questions encouraged   reassurance provided   thoughts/feelings acknowledged  Goal: Readiness for  Transition of Care  Outcome: Progressing   Goal Outcome Evaluation:

## 2024-11-18 VITALS
BODY MASS INDEX: 24.87 KG/M2 | RESPIRATION RATE: 16 BRPM | OXYGEN SATURATION: 96 % | DIASTOLIC BLOOD PRESSURE: 77 MMHG | TEMPERATURE: 97.4 F | HEIGHT: 75 IN | WEIGHT: 200 LBS | HEART RATE: 68 BPM | SYSTOLIC BLOOD PRESSURE: 137 MMHG

## 2024-11-18 LAB
BASOPHILS # BLD AUTO: 0.06 10*3/MM3 (ref 0–0.2)
BASOPHILS NFR BLD AUTO: 0.5 % (ref 0–1.5)
DEPRECATED RDW RBC AUTO: 42.8 FL (ref 37–54)
EOSINOPHIL # BLD AUTO: 0.07 10*3/MM3 (ref 0–0.4)
EOSINOPHIL NFR BLD AUTO: 0.6 % (ref 0.3–6.2)
ERYTHROCYTE [DISTWIDTH] IN BLOOD BY AUTOMATED COUNT: 12.7 % (ref 12.3–15.4)
HCT VFR BLD AUTO: 41.7 % (ref 37.5–51)
HGB BLD-MCNC: 14.1 G/DL (ref 13–17.7)
IMM GRANULOCYTES # BLD AUTO: 0.03 10*3/MM3 (ref 0–0.05)
IMM GRANULOCYTES NFR BLD AUTO: 0.3 % (ref 0–0.5)
LYMPHOCYTES # BLD AUTO: 2.81 10*3/MM3 (ref 0.7–3.1)
LYMPHOCYTES NFR BLD AUTO: 23.7 % (ref 19.6–45.3)
MCH RBC QN AUTO: 31.1 PG (ref 26.6–33)
MCHC RBC AUTO-ENTMCNC: 33.8 G/DL (ref 31.5–35.7)
MCV RBC AUTO: 92.1 FL (ref 79–97)
MONOCYTES # BLD AUTO: 0.93 10*3/MM3 (ref 0.1–0.9)
MONOCYTES NFR BLD AUTO: 7.8 % (ref 5–12)
NEUTROPHILS NFR BLD AUTO: 67.1 % (ref 42.7–76)
NEUTROPHILS NFR BLD AUTO: 7.98 10*3/MM3 (ref 1.7–7)
NRBC BLD AUTO-RTO: 0 /100 WBC (ref 0–0.2)
PLATELET # BLD AUTO: 455 10*3/MM3 (ref 140–450)
PMV BLD AUTO: 10.5 FL (ref 6–12)
RBC # BLD AUTO: 4.53 10*6/MM3 (ref 4.14–5.8)
WBC NRBC COR # BLD AUTO: 11.88 10*3/MM3 (ref 3.4–10.8)

## 2024-11-18 PROCEDURE — 96375 TX/PRO/DX INJ NEW DRUG ADDON: CPT

## 2024-11-18 PROCEDURE — 85025 COMPLETE CBC W/AUTO DIFF WBC: CPT | Performed by: NURSE PRACTITIONER

## 2024-11-18 PROCEDURE — 25010000002 LABETALOL 5 MG/ML SOLUTION: Performed by: INTERNAL MEDICINE

## 2024-11-18 PROCEDURE — G0378 HOSPITAL OBSERVATION PER HR: HCPCS

## 2024-11-18 RX ORDER — CARBAMAZEPINE 200 MG/1
200 TABLET ORAL 3 TIMES DAILY
Qty: 21 TABLET | Refills: 0 | Status: SHIPPED | OUTPATIENT
Start: 2024-11-22 | End: 2024-11-29

## 2024-11-18 RX ORDER — CARBAMAZEPINE 200 MG/1
TABLET ORAL
Qty: 90 TABLET | Refills: 0 | Status: SHIPPED | OUTPATIENT
Start: 2024-11-18 | End: 2024-12-17

## 2024-11-18 RX ORDER — ATORVASTATIN CALCIUM 20 MG/1
20 TABLET, FILM COATED ORAL NIGHTLY
Qty: 30 TABLET | Refills: 1 | Status: SHIPPED | OUTPATIENT
Start: 2024-11-18

## 2024-11-18 RX ORDER — CARBAMAZEPINE 200 MG/1
300 TABLET ORAL 3 TIMES DAILY
Qty: 90 TABLET | Refills: 1 | Status: SHIPPED | OUTPATIENT
Start: 2024-12-06

## 2024-11-18 RX ORDER — CARBAMAZEPINE 200 MG/1
TABLET ORAL
Qty: 28 TABLET | Refills: 0 | Status: SHIPPED | OUTPATIENT
Start: 2024-11-29 | End: 2024-12-06

## 2024-11-18 RX ORDER — LABETALOL HYDROCHLORIDE 5 MG/ML
10 INJECTION, SOLUTION INTRAVENOUS
Status: DISCONTINUED | OUTPATIENT
Start: 2024-11-18 | End: 2024-11-18 | Stop reason: HOSPADM

## 2024-11-18 RX ORDER — ASPIRIN 81 MG/1
81 TABLET ORAL DAILY
Qty: 30 TABLET | Refills: 2 | Status: SHIPPED | OUTPATIENT
Start: 2024-11-18

## 2024-11-18 RX ORDER — ESCITALOPRAM OXALATE 10 MG/1
10 TABLET ORAL DAILY
Qty: 20 TABLET | Refills: 0 | Status: SHIPPED | OUTPATIENT
Start: 2024-11-18

## 2024-11-18 RX ADMIN — ESCITALOPRAM OXALATE 10 MG: 10 TABLET ORAL at 09:00

## 2024-11-18 RX ADMIN — ASPIRIN 81 MG: 81 TABLET, COATED ORAL at 09:00

## 2024-11-18 RX ADMIN — CARBAMAZEPINE 200 MG: 200 TABLET ORAL at 09:00

## 2024-11-18 RX ADMIN — NICOTINE 1 PATCH: 21 PATCH TRANSDERMAL at 09:02

## 2024-11-18 RX ADMIN — Medication 10 ML: at 09:02

## 2024-11-18 RX ADMIN — LABETALOL HYDROCHLORIDE 10 MG: 5 INJECTION, SOLUTION INTRAVENOUS at 01:47

## 2024-11-18 NOTE — PLAN OF CARE
Goal Outcome Evaluation:  Plan of Care Reviewed With: patient        Progress: improving     Patient discharging home today, declines inpatient rehab, able to ambulate independently, no pain noted, questions and concerns answered.      Problem: Adult Inpatient Plan of Care  Goal: Plan of Care Review  Outcome: Progressing  Flowsheets (Taken 11/18/2024 1025)  Progress: improving  Plan of Care Reviewed With: patient  Goal: Patient-Specific Goal (Individualized)  Outcome: Progressing  Goal: Absence of Hospital-Acquired Illness or Injury  Outcome: Progressing  Intervention: Identify and Manage Fall Risk  Recent Flowsheet Documentation  Taken 11/18/2024 1000 by Maria Luisa Valadez RN  Safety Promotion/Fall Prevention: safety round/check completed  Taken 11/18/2024 0800 by Maria Luisa Valadez RN  Safety Promotion/Fall Prevention: safety round/check completed  Intervention: Prevent and Manage VTE (Venous Thromboembolism) Risk  Recent Flowsheet Documentation  Taken 11/18/2024 0800 by Maria Luisa Valadez RN  VTE Prevention/Management: patient refused intervention  Goal: Optimal Comfort and Wellbeing  Outcome: Progressing  Intervention: Provide Person-Centered Care  Recent Flowsheet Documentation  Taken 11/18/2024 0800 by Maria Luisa Valadez RN  Trust Relationship/Rapport:   care explained   questions answered   questions encouraged   reassurance provided  Goal: Readiness for Transition of Care  Outcome: Progressing

## 2024-11-18 NOTE — PLAN OF CARE
Problem: Adult Inpatient Plan of Care  Goal: Plan of Care Review  Outcome: Progressing  Goal: Patient-Specific Goal (Individualized)  Outcome: Progressing  Goal: Absence of Hospital-Acquired Illness or Injury  Outcome: Progressing  Intervention: Identify and Manage Fall Risk  Recent Flowsheet Documentation  Taken 11/18/2024 0200 by Maria Luisa Francis LPN  Safety Promotion/Fall Prevention:   assistive device/personal items within reach   clutter free environment maintained   safety round/check completed   room organization consistent  Taken 11/18/2024 0006 by Maria Luisa Francis LPN  Safety Promotion/Fall Prevention:   assistive device/personal items within reach   clutter free environment maintained   nonskid shoes/slippers when out of bed   room organization consistent   safety round/check completed  Taken 11/17/2024 2200 by Maria Luisa Francis LPN  Safety Promotion/Fall Prevention:   assistive device/personal items within reach   clutter free environment maintained   safety round/check completed   room organization consistent   nonskid shoes/slippers when out of bed  Taken 11/17/2024 2000 by Maria Luisa Francis LPN  Safety Promotion/Fall Prevention:   clutter free environment maintained   assistive device/personal items within reach   safety round/check completed   room organization consistent   nonskid shoes/slippers when out of bed  Intervention: Prevent Skin Injury  Recent Flowsheet Documentation  Taken 11/18/2024 0006 by Maria Luisa Francis LPN  Body Position: position changed independently  Taken 11/17/2024 2000 by Maria Luisa Francis LPN  Body Position: position changed independently  Skin Protection: transparent dressing maintained  Intervention: Prevent and Manage VTE (Venous Thromboembolism) Risk  Recent Flowsheet Documentation  Taken 11/17/2024 2000 by Maria Luisa Francis LPN  VTE Prevention/Management:   bilateral   SCDs (sequential compression devices) off   patient refused intervention  Intervention: Prevent Infection  Recent Flowsheet  Documentation  Taken 11/18/2024 0200 by Maria Luisa Francis LPN  Infection Prevention:   equipment surfaces disinfected   environmental surveillance performed   hand hygiene promoted   personal protective equipment utilized   rest/sleep promoted   single patient room provided  Taken 11/18/2024 0006 by Maria Luisa Francis LPN  Infection Prevention:   environmental surveillance performed   equipment surfaces disinfected   hand hygiene promoted   personal protective equipment utilized   single patient room provided   rest/sleep promoted  Taken 11/17/2024 2200 by Maria Luisa Francis LPN  Infection Prevention:   environmental surveillance performed   equipment surfaces disinfected   hand hygiene promoted   personal protective equipment utilized   rest/sleep promoted   single patient room provided  Taken 11/17/2024 2000 by Maria Luisa Francis LPN  Infection Prevention:   environmental surveillance performed   equipment surfaces disinfected   hand hygiene promoted   personal protective equipment utilized   rest/sleep promoted   single patient room provided  Goal: Optimal Comfort and Wellbeing  Outcome: Progressing  Intervention: Provide Person-Centered Care  Recent Flowsheet Documentation  Taken 11/17/2024 2000 by Maria Luisa Francis LPN  Trust Relationship/Rapport:   care explained   choices provided   thoughts/feelings acknowledged  Goal: Readiness for Transition of Care  Outcome: Progressing   Goal Outcome Evaluation:

## 2024-11-18 NOTE — SIGNIFICANT NOTE
11/18/24 0901   OTHER   Discipline physical therapist   Rehab Time/Intention   Session Not Performed other (see comments)  (Pt d/c pending.)   Recommendation   PT - Next Appointment 11/19/24

## 2024-11-18 NOTE — DISCHARGE SUMMARY
[facility] Medicine Services  Discharge Summary    Date of Service: 2024  Patient Name: Juaquin Pena  : 1970  MRN: [id number]    Date of Admission: 2024  Discharge Diagnosis:      seizure disorder    Date of Discharge: 2024  Primary Care Physician: Provider, No Known      Presenting Problem:   Syncope and collapse [R55]  Seizure [R56.9]  Acute urinary tract infection [N39.0]  Acute left-sided weakness [R53.1]  Marijuana use [F12.90]  History of seizure [Z87.898]  Temporary weakness of extremity [R29.898]    Active and Resolved Hospital Problems:  Active Hospital Problems    Diagnosis POA    **Temporary weakness of extremity [R29.898] Yes    AMS (altered mental status) [R41.82] Yes    Hypophosphatemia [E83.39] Yes    Marijuana use [F12.90] Yes    Acute UTI (urinary tract infection) [N39.0] Yes    Tobacco use [Z72.0] Yes      Resolved Hospital Problems   No resolved problems to display.         Hospital Course     HPI:    As per history and physical of 2024.    Hospital Course:  This is a 54-year-old white male who I was asked to discharge on my initial day of contact.  Patient initially presented with issues as discussed in history and physical.  He was afebrile during his hospitalization.  Oxygenation was maintained on room air.  Blood pressures were controlled.  Culture showed mixed urinary organisms.  Scan of the head showed no acute process.  Perfusion showed no evidence of ischemic flow.  The angiogram of the neck showed no critical atherosclerotic narrowing.  There is no intracerebral aneurysm identified.  X-ray showed no acute process.  MRI brain showed some scattered periventricular subcortical hyperdensities of a nonspecific nature suggestive of left some chronic small vessel ischemic changes.  Otherwise without any acute process.  Noted to decrease phosphorus that was corrected.  CBC was nonremarkable.  Lites and liver functions were  nonremarkable.  Drug screen was positive for THC.  Functions appear normal.  Evaluation by neurology.  Noted EEG performed noted abnormalities and was subsequently began on Tegretol with increasing dose.  It was thought by neurology that he could be followed up as an outpatient.  Of his driving restrictions.              Day of Discharge     Vital Signs:  Temp:  [97.4 °F (36.3 °C)-98.1 °F (36.7 °C)] 97.4 °F (36.3 °C)  Heart Rate:  [68-81] 68  Resp:  [16] 16  BP: (137-182)/() 137/77    Physical Exam:  Physical Exam  Vitals reviewed.   Constitutional:       Appearance: Normal appearance.   HENT:      Head: Normocephalic and atraumatic.   Cardiovascular:      Rate and Rhythm: Normal rate and regular rhythm.   Pulmonary:      Effort: Pulmonary effort is normal.      Breath sounds: Normal breath sounds.   Abdominal:      General: Bowel sounds are normal.      Palpations: Abdomen is soft.   Neurological:      Mental Status: He is alert. Mental status is at baseline.            Pertinent  and/or Most Recent Results     LAB RESULTS:      Lab 11/18/24  0113 11/17/24  0535 11/15/24  2239 11/15/24  0103 11/14/24  1538   WBC 11.88* 8.95 9.89 9.02 11.45*   HEMOGLOBIN 14.1 12.9* 13.3 13.5 14.1   HEMATOCRIT 41.7 38.4 40.5 40.1 43.0   PLATELETS 455* 384 398 347 351   NEUTROS ABS 7.98* 5.07 5.18 4.65 7.52*   IMMATURE GRANS (ABS) 0.03 0.02 0.03 0.03 0.03   LYMPHS ABS 2.81 2.80 3.07 2.90 2.57   MONOS ABS 0.93* 0.86 1.38* 1.26* 1.22*   EOS ABS 0.07 0.14 0.17 0.13 0.05   MCV 92.1 92.8 92.3 92.0 92.3   PROTIME  --   --   --   --  12.7   APTT  --   --   --   --  26.1         Lab 11/17/24  0535 11/15/24  2239 11/15/24  0103 11/14/24  1538   SODIUM 138 140 138 139   POTASSIUM 4.1 4.0 3.7 4.2   CHLORIDE 103 105 103 101   CO2 25.9 25.3 26.6 28.3   ANION GAP 9.1 9.7 8.4 9.7   BUN 12 11 12 12   CREATININE 1.02 1.16 1.04 1.11   EGFR 87.3 74.8 85.3 78.9   GLUCOSE 95 96 110* 101*   CALCIUM 8.7 9.0 9.0 9.6   MAGNESIUM  --   --   --  2.2    PHOSPHORUS  --   --   --  1.8*   HEMOGLOBIN A1C  --   --   --  5.55   TSH  --   --  1.230  --          Lab 11/14/24  1538   TOTAL PROTEIN 7.4   ALBUMIN 4.4   GLOBULIN 3.0   ALT (SGPT) 18   AST (SGOT) 37   BILIRUBIN 0.7   ALK PHOS 101         Lab 11/14/24  1538   HSTROP T 7   PROTIME 12.7   INR 0.95         Lab 11/15/24  0103   CHOLESTEROL 166   LDL CHOL 108*   HDL CHOL 42   TRIGLYCERIDES 83         Lab 11/14/24  1538   ABO TYPING B   RH TYPING Positive   ANTIBODY SCREEN Negative         Brief Urine Lab Results  (Last result in the past 365 days)        Color   Clarity   Blood   Leuk Est   Nitrite   Protein   CREAT   Urine HCG        11/14/24 1551 Yellow   Cloudy   Negative   Small (1+)   Positive   Negative                 Microbiology Results (last 10 days)       Procedure Component Value - Date/Time    Urine Culture - Urine, Urine, Clean Catch [[id number]] Collected: 11/14/24 1551    Lab Status: Final result Specimen: Urine, Clean Catch Updated: 11/16/24 1151     Urine Culture 50,000 CFU/mL Mixed Aliyah Isolated    Narrative:      Specimen contains mixed organisms of questionable pathogenicity suggestive of contamination. If symptoms persist, suggest recollection.  Colonization of the urinary tract without infection is common. Treatment is discouraged unless the patient is symptomatic, pregnant, or undergoing an invasive urologic procedure.            MRI Brain Without Contrast    Result Date: 11/14/2024  Impression: Impression: No acute intracranial abnormality. Scattered periventricular and subcortical T2/FLAIR hyperintensities are nonspecific but most likely represents chronic small vessel ischemic changes. Electronically Signed: Adama Winters DO  11/14/2024 10:15 PM EST  Workstation ID: [id number]    CT Angiogram Head w AI Analysis of LVO    Result Date: 11/14/2024  Impression: 1.No acute abnormality is identified within the large arteries of the head or neck. 2.No significant stenosis of the bilateral  internal carotid arteries. Electronically Signed: Seth Kapoor MD  11/14/2024 4:24 PM EST  Workstation ID: [id number]    CT Angiogram Neck    Result Date: 11/14/2024  Impression: 1.No acute abnormality is identified within the large arteries of the head or neck. 2.No significant stenosis of the bilateral internal carotid arteries. Electronically Signed: Seth Kapoor MD  11/14/2024 4:24 PM EST  Workstation ID: [id number]    XR Chest 1 View    Result Date: 11/14/2024  Impression: Impression: No acute chest finding Electronically Signed: Nereida Erwin MD  11/14/2024 4:21 PM EST  Workstation ID: [id number]    CT CEREBRAL PERFUSION WITH & WITHOUT CONTRAST    Result Date: 11/14/2024  Impression: Impression: Normal, negative CT cerebral perfusion study. Electronically Signed: Nereida Erwin MD  11/14/2024 3:32 PM EST  Workstation ID: [id number]    CT Head Without Contrast Stroke Protocol    Result Date: 11/14/2024  Impression: Impression: No acute intracranial finding. Electronically Signed: Charly Starkey  11/14/2024 3:20 PM EST  Workstation ID: [id number]             Results for orders placed during the hospital encounter of 11/14/24    Adult Transthoracic Echo Complete W/ Cont if Necessary Per Protocol    Interpretation Summary    Left ventricular systolic function is normal. Estimated left ventricular EF = 60% Left ventricular ejection fraction appears to be 56 - 60%.    Left ventricular wall thickness is consistent with mild concentric hypertrophy.    Left ventricular diastolic function was normal.    Transthoracic echocardiography reveals EF of 60% and no effusion    Electronically signed by Dann Trevino MD, 11/15/24, 7:00 PM EST.      Labs Pending at Discharge:  Pending Results       None            Procedures Performed    11/16 0927 EEG      Consults:   Consults       Date and Time Order Name Status Description    11/14/2024  9:26 PM Inpatient Neurology Consult Stroke Completed     11/14/2024  3:06 PM  Inpatient Neurology Consult Stroke Completed     11/14/2024  3:06 PM Inpatient Neurology Consult Stroke Completed               Discharge Details        Discharge Medications        New Medications        Instructions Start Date   Aspirin Low Dose 81 MG EC tablet  Generic drug: aspirin   81 mg, Oral, Daily      atorvastatin 20 MG tablet  Commonly known as: LIPITOR   20 mg, Oral, Nightly      carBAMazepine 200 MG tablet  Commonly known as: TEGretol   Take 1 tablet by mouth 2 (Two) Times a Day for 4 days, THEN 1 tablet 3 times a day for 7 days, THEN 1.5 tablets twice daily and 1 tablet in the evening for 7 days, THEN 1.5 tablets 3 times a day   Start Date: November 18, 2024     carBAMazepine 200 MG tablet  Commonly known as: TEGretol   200 mg, Oral, 3 Times Daily   Start Date: November 22, 2024     carBAMazepine 200 MG tablet  Commonly known as: TEGretol   Take 1.5 tablets by mouth 2 (Two) Times a Day AND 1 tablet Daily. Do all this for 7 days.   Start Date: November 29, 2024     carBAMazepine 200 MG tablet  Commonly known as: TEGretol   300 mg, Oral, 3 Times Daily   Start Date: December 6, 2024     escitalopram 10 MG tablet  Commonly known as: LEXAPRO   10 mg, Oral, Daily               Allergies   Allergen Reactions    Morphine Unknown - High Severity     Unknown, pt mother told him he had a bad reaction as a child          Discharge Disposition: home  Home or Self Care    Diet:  Hospital:No active diet order        Discharge Activity:         CODE STATUS:  Code Status and Medical Interventions: CPR (Attempt to Resuscitate); Full Support   Ordered at: 11/14/24 2124     Code Status (Patient has no pulse and is not breathing):    CPR (Attempt to Resuscitate)     Medical Interventions (Patient has pulse or is breathing):    Full Support         No future appointments.    Additional Instructions for the Follow-ups that You Need to Schedule       Ambulatory Referral to Neurology   As directed      Ambulatory Referral to  Physical Therapy   As directed      Specialty needed: Evaluate and treat   Follow-up needed: Yes        Discharge Follow-up with PCP   As directed       Currently Documented PCP:    Provider, No Known    PCP Phone Number:    None     Follow Up Details: one week        Discharge Follow-up with Specified Provider: neurology; 1 Month   As directed      To: neurology   Follow Up: 1 Month                Time spent on Discharge including face to face service:  45 minutes    Signature: Electronically signed by Timothy Duane Brammell, MD, 11/18/24, 18:09 EST.  [facility]ist Team    Detail Level: Detailed

## 2024-11-19 NOTE — CASE MANAGEMENT/SOCIAL WORK
Case Management Discharge Note      Final Note: Home    Provided Post Acute Provider List?: Yes  Post Acute Provider List: Inpatient Rehab  Delivered To: Patient, Support Person  Support Person: Wife, Ledy  Method of Delivery: In person    Selected Continued Care - Discharged on 11/18/2024 Admission date: 11/14/2024 - Discharge disposition: Home or Self Care           Transportation Services  Private: Car    Final Discharge Disposition Code: 01 - home or self-care

## 2024-12-26 ENCOUNTER — APPOINTMENT (OUTPATIENT)
Dept: CT IMAGING | Facility: HOSPITAL | Age: 54
End: 2024-12-26
Payer: MEDICAID

## 2024-12-26 ENCOUNTER — APPOINTMENT (OUTPATIENT)
Dept: ULTRASOUND IMAGING | Facility: HOSPITAL | Age: 54
End: 2024-12-26
Payer: MEDICAID

## 2024-12-26 ENCOUNTER — HOSPITAL ENCOUNTER (OUTPATIENT)
Facility: HOSPITAL | Age: 54
Setting detail: OBSERVATION
Discharge: HOME | End: 2024-12-27
Attending: EMERGENCY MEDICINE | Admitting: EMERGENCY MEDICINE
Payer: MEDICAID

## 2024-12-26 DIAGNOSIS — R10.13 ACUTE EPIGASTRIC PAIN: ICD-10-CM

## 2024-12-26 DIAGNOSIS — R10.9 ACUTE ABDOMINAL PAIN: Primary | ICD-10-CM

## 2024-12-26 LAB
ALBUMIN SERPL-MCNC: 3.8 G/DL (ref 3.5–5.2)
ALBUMIN/GLOB SERPL: 1.5 G/DL
ALP SERPL-CCNC: 95 U/L (ref 39–117)
ALT SERPL W P-5'-P-CCNC: 16 U/L (ref 1–41)
ANION GAP SERPL CALCULATED.3IONS-SCNC: 8.6 MMOL/L (ref 5–15)
AST SERPL-CCNC: 26 U/L (ref 1–40)
BASOPHILS # BLD AUTO: 0.03 10*3/MM3 (ref 0–0.2)
BASOPHILS NFR BLD AUTO: 0.3 % (ref 0–1.5)
BILIRUB SERPL-MCNC: 0.2 MG/DL (ref 0–1.2)
BILIRUB UR QL STRIP: NEGATIVE
BUN SERPL-MCNC: 13 MG/DL (ref 6–20)
BUN/CREAT SERPL: 12.4 (ref 7–25)
CALCIUM SPEC-SCNC: 9.2 MG/DL (ref 8.6–10.5)
CHLORIDE SERPL-SCNC: 104 MMOL/L (ref 98–107)
CLARITY UR: CLEAR
CO2 SERPL-SCNC: 30.4 MMOL/L (ref 22–29)
COLOR UR: YELLOW
CREAT SERPL-MCNC: 1.05 MG/DL (ref 0.76–1.27)
DEPRECATED RDW RBC AUTO: 42.5 FL (ref 37–54)
EGFRCR SERPLBLD CKD-EPI 2021: 84.4 ML/MIN/1.73
EOSINOPHIL # BLD AUTO: 0.18 10*3/MM3 (ref 0–0.4)
EOSINOPHIL NFR BLD AUTO: 1.9 % (ref 0.3–6.2)
ERYTHROCYTE [DISTWIDTH] IN BLOOD BY AUTOMATED COUNT: 12.8 % (ref 12.3–15.4)
GEN 5 1HR TROPONIN T REFLEX: 16 NG/L
GLOBULIN UR ELPH-MCNC: 2.6 GM/DL
GLUCOSE SERPL-MCNC: 103 MG/DL (ref 65–99)
GLUCOSE UR STRIP-MCNC: NEGATIVE MG/DL
HCT VFR BLD AUTO: 42 % (ref 37.5–51)
HGB BLD-MCNC: 14.3 G/DL (ref 13–17.7)
HGB UR QL STRIP.AUTO: NEGATIVE
IMM GRANULOCYTES # BLD AUTO: 0.05 10*3/MM3 (ref 0–0.05)
IMM GRANULOCYTES NFR BLD AUTO: 0.5 % (ref 0–0.5)
KETONES UR QL STRIP: NEGATIVE
LEUKOCYTE ESTERASE UR QL STRIP.AUTO: NEGATIVE
LIPASE SERPL-CCNC: 33 U/L (ref 13–60)
LYMPHOCYTES # BLD AUTO: 1.86 10*3/MM3 (ref 0.7–3.1)
LYMPHOCYTES NFR BLD AUTO: 19.5 % (ref 19.6–45.3)
MCH RBC QN AUTO: 31.2 PG (ref 26.6–33)
MCHC RBC AUTO-ENTMCNC: 34 G/DL (ref 31.5–35.7)
MCV RBC AUTO: 91.7 FL (ref 79–97)
MONOCYTES # BLD AUTO: 1.11 10*3/MM3 (ref 0.1–0.9)
MONOCYTES NFR BLD AUTO: 11.6 % (ref 5–12)
NEUTROPHILS NFR BLD AUTO: 6.32 10*3/MM3 (ref 1.7–7)
NEUTROPHILS NFR BLD AUTO: 66.2 % (ref 42.7–76)
NITRITE UR QL STRIP: NEGATIVE
NRBC BLD AUTO-RTO: 0 /100 WBC (ref 0–0.2)
PH UR STRIP.AUTO: 6 [PH] (ref 5–8)
PLATELET # BLD AUTO: 325 10*3/MM3 (ref 140–450)
PMV BLD AUTO: 9.3 FL (ref 6–12)
POTASSIUM SERPL-SCNC: 3.8 MMOL/L (ref 3.5–5.2)
PROT SERPL-MCNC: 6.4 G/DL (ref 6–8.5)
PROT UR QL STRIP: NEGATIVE
QT INTERVAL: 403 MS
QT INTERVAL: 427 MS
QTC INTERVAL: 385 MS
QTC INTERVAL: 399 MS
RBC # BLD AUTO: 4.58 10*6/MM3 (ref 4.14–5.8)
SODIUM SERPL-SCNC: 143 MMOL/L (ref 136–145)
SP GR UR STRIP: 1.05 (ref 1–1.03)
TROPONIN T NUMERIC DELTA: 9 NG/L
TROPONIN T SERPL HS-MCNC: 7 NG/L
UROBILINOGEN UR QL STRIP: ABNORMAL
WBC NRBC COR # BLD AUTO: 9.55 10*3/MM3 (ref 3.4–10.8)

## 2024-12-26 PROCEDURE — 83690 ASSAY OF LIPASE: CPT | Performed by: EMERGENCY MEDICINE

## 2024-12-26 PROCEDURE — 25810000003 LACTATED RINGERS SOLUTION: Performed by: PHYSICIAN ASSISTANT

## 2024-12-26 PROCEDURE — 96361 HYDRATE IV INFUSION ADD-ON: CPT

## 2024-12-26 PROCEDURE — 25010000002 HYDROMORPHONE 1 MG/ML SOLUTION: Performed by: EMERGENCY MEDICINE

## 2024-12-26 PROCEDURE — 85025 COMPLETE CBC W/AUTO DIFF WBC: CPT | Performed by: EMERGENCY MEDICINE

## 2024-12-26 PROCEDURE — 25510000001 IOPAMIDOL PER 1 ML: Performed by: EMERGENCY MEDICINE

## 2024-12-26 PROCEDURE — 93005 ELECTROCARDIOGRAM TRACING: CPT | Performed by: PHYSICIAN ASSISTANT

## 2024-12-26 PROCEDURE — 96376 TX/PRO/DX INJ SAME DRUG ADON: CPT

## 2024-12-26 PROCEDURE — 96375 TX/PRO/DX INJ NEW DRUG ADDON: CPT

## 2024-12-26 PROCEDURE — 25010000002 HYDROMORPHONE PER 4 MG: Performed by: PHYSICIAN ASSISTANT

## 2024-12-26 PROCEDURE — G0378 HOSPITAL OBSERVATION PER HR: HCPCS

## 2024-12-26 PROCEDURE — 96374 THER/PROPH/DIAG INJ IV PUSH: CPT

## 2024-12-26 PROCEDURE — 25810000003 LACTATED RINGERS PER 1000 ML: Performed by: PHYSICIAN ASSISTANT

## 2024-12-26 PROCEDURE — 80053 COMPREHEN METABOLIC PANEL: CPT | Performed by: EMERGENCY MEDICINE

## 2024-12-26 PROCEDURE — 84484 ASSAY OF TROPONIN QUANT: CPT | Performed by: PHYSICIAN ASSISTANT

## 2024-12-26 PROCEDURE — 99285 EMERGENCY DEPT VISIT HI MDM: CPT

## 2024-12-26 PROCEDURE — 76705 ECHO EXAM OF ABDOMEN: CPT

## 2024-12-26 PROCEDURE — 25010000002 ONDANSETRON PER 1 MG: Performed by: EMERGENCY MEDICINE

## 2024-12-26 PROCEDURE — 81003 URINALYSIS AUTO W/O SCOPE: CPT | Performed by: EMERGENCY MEDICINE

## 2024-12-26 PROCEDURE — 74177 CT ABD & PELVIS W/CONTRAST: CPT

## 2024-12-26 PROCEDURE — 36415 COLL VENOUS BLD VENIPUNCTURE: CPT

## 2024-12-26 RX ORDER — ATORVASTATIN CALCIUM 20 MG/1
20 TABLET, FILM COATED ORAL NIGHTLY
Status: DISCONTINUED | OUTPATIENT
Start: 2024-12-26 | End: 2024-12-27 | Stop reason: HOSPADM

## 2024-12-26 RX ORDER — ONDANSETRON 4 MG/1
4 TABLET, ORALLY DISINTEGRATING ORAL EVERY 6 HOURS PRN
Status: DISCONTINUED | OUTPATIENT
Start: 2024-12-26 | End: 2024-12-27 | Stop reason: HOSPADM

## 2024-12-26 RX ORDER — BISACODYL 10 MG
10 SUPPOSITORY, RECTAL RECTAL DAILY PRN
Status: DISCONTINUED | OUTPATIENT
Start: 2024-12-26 | End: 2024-12-27 | Stop reason: HOSPADM

## 2024-12-26 RX ORDER — ALUMINA, MAGNESIA, AND SIMETHICONE 2400; 2400; 240 MG/30ML; MG/30ML; MG/30ML
15 SUSPENSION ORAL EVERY 6 HOURS PRN
Status: DISCONTINUED | OUTPATIENT
Start: 2024-12-26 | End: 2024-12-26

## 2024-12-26 RX ORDER — ESCITALOPRAM OXALATE 10 MG/1
10 TABLET ORAL DAILY
Status: DISCONTINUED | OUTPATIENT
Start: 2024-12-26 | End: 2024-12-27 | Stop reason: HOSPADM

## 2024-12-26 RX ORDER — SODIUM CHLORIDE 0.9 % (FLUSH) 0.9 %
10 SYRINGE (ML) INJECTION EVERY 12 HOURS SCHEDULED
Status: DISCONTINUED | OUTPATIENT
Start: 2024-12-26 | End: 2024-12-27 | Stop reason: HOSPADM

## 2024-12-26 RX ORDER — HYDROMORPHONE HYDROCHLORIDE 1 MG/ML
0.5 INJECTION, SOLUTION INTRAMUSCULAR; INTRAVENOUS; SUBCUTANEOUS ONCE AS NEEDED
Status: COMPLETED | OUTPATIENT
Start: 2024-12-26 | End: 2024-12-26

## 2024-12-26 RX ORDER — ASPIRIN 81 MG/1
81 TABLET ORAL DAILY
Status: DISCONTINUED | OUTPATIENT
Start: 2024-12-26 | End: 2024-12-27 | Stop reason: HOSPADM

## 2024-12-26 RX ORDER — POLYETHYLENE GLYCOL 3350 17 G/17G
17 POWDER, FOR SOLUTION ORAL DAILY PRN
Status: DISCONTINUED | OUTPATIENT
Start: 2024-12-26 | End: 2024-12-27 | Stop reason: HOSPADM

## 2024-12-26 RX ORDER — IOPAMIDOL 755 MG/ML
100 INJECTION, SOLUTION INTRAVASCULAR
Status: COMPLETED | OUTPATIENT
Start: 2024-12-26 | End: 2024-12-26

## 2024-12-26 RX ORDER — PANTOPRAZOLE SODIUM 40 MG/10ML
40 INJECTION, POWDER, LYOPHILIZED, FOR SOLUTION INTRAVENOUS
Status: DISCONTINUED | OUTPATIENT
Start: 2024-12-26 | End: 2024-12-27 | Stop reason: HOSPADM

## 2024-12-26 RX ORDER — SODIUM CHLORIDE 0.9 % (FLUSH) 0.9 %
10 SYRINGE (ML) INJECTION AS NEEDED
Status: DISCONTINUED | OUTPATIENT
Start: 2024-12-26 | End: 2024-12-27 | Stop reason: HOSPADM

## 2024-12-26 RX ORDER — NITROGLYCERIN 0.4 MG/1
0.4 TABLET SUBLINGUAL
Status: DISCONTINUED | OUTPATIENT
Start: 2024-12-26 | End: 2024-12-27 | Stop reason: HOSPADM

## 2024-12-26 RX ORDER — SODIUM CHLORIDE 9 MG/ML
40 INJECTION, SOLUTION INTRAVENOUS AS NEEDED
Status: DISCONTINUED | OUTPATIENT
Start: 2024-12-26 | End: 2024-12-27 | Stop reason: HOSPADM

## 2024-12-26 RX ORDER — FAMOTIDINE 10 MG/ML
20 INJECTION, SOLUTION INTRAVENOUS ONCE
Status: COMPLETED | OUTPATIENT
Start: 2024-12-26 | End: 2024-12-26

## 2024-12-26 RX ORDER — ONDANSETRON 2 MG/ML
4 INJECTION INTRAMUSCULAR; INTRAVENOUS ONCE
Status: COMPLETED | OUTPATIENT
Start: 2024-12-26 | End: 2024-12-26

## 2024-12-26 RX ORDER — HYDROCODONE BITARTRATE AND ACETAMINOPHEN 5; 325 MG/1; MG/1
1 TABLET ORAL EVERY 6 HOURS PRN
Status: DISCONTINUED | OUTPATIENT
Start: 2024-12-26 | End: 2024-12-27 | Stop reason: HOSPADM

## 2024-12-26 RX ORDER — NICOTINE 21 MG/24HR
1 PATCH, TRANSDERMAL 24 HOURS TRANSDERMAL EVERY 24 HOURS
Status: DISCONTINUED | OUTPATIENT
Start: 2024-12-26 | End: 2024-12-27 | Stop reason: HOSPADM

## 2024-12-26 RX ORDER — BISACODYL 5 MG/1
5 TABLET, DELAYED RELEASE ORAL DAILY PRN
Status: DISCONTINUED | OUTPATIENT
Start: 2024-12-26 | End: 2024-12-27 | Stop reason: HOSPADM

## 2024-12-26 RX ORDER — LIDOCAINE HYDROCHLORIDE 20 MG/ML
10 SOLUTION OROPHARYNGEAL ONCE
Status: COMPLETED | OUTPATIENT
Start: 2024-12-26 | End: 2024-12-26

## 2024-12-26 RX ORDER — ONDANSETRON 2 MG/ML
4 INJECTION INTRAMUSCULAR; INTRAVENOUS EVERY 6 HOURS PRN
Status: DISCONTINUED | OUTPATIENT
Start: 2024-12-26 | End: 2024-12-27 | Stop reason: HOSPADM

## 2024-12-26 RX ORDER — AMOXICILLIN 250 MG
2 CAPSULE ORAL 2 TIMES DAILY PRN
Status: DISCONTINUED | OUTPATIENT
Start: 2024-12-26 | End: 2024-12-27 | Stop reason: HOSPADM

## 2024-12-26 RX ORDER — SODIUM CHLORIDE, SODIUM LACTATE, POTASSIUM CHLORIDE, CALCIUM CHLORIDE 600; 310; 30; 20 MG/100ML; MG/100ML; MG/100ML; MG/100ML
125 INJECTION, SOLUTION INTRAVENOUS CONTINUOUS
Status: DISPENSED | OUTPATIENT
Start: 2024-12-26 | End: 2024-12-26

## 2024-12-26 RX ORDER — CARBAMAZEPINE 200 MG/1
300 TABLET ORAL 3 TIMES DAILY
Status: DISCONTINUED | OUTPATIENT
Start: 2024-12-26 | End: 2024-12-27 | Stop reason: HOSPADM

## 2024-12-26 RX ORDER — LORAZEPAM 2 MG/ML
2 INJECTION INTRAMUSCULAR ONCE AS NEEDED
Status: DISCONTINUED | OUTPATIENT
Start: 2024-12-26 | End: 2024-12-27 | Stop reason: HOSPADM

## 2024-12-26 RX ORDER — ALUMINA, MAGNESIA, AND SIMETHICONE 2400; 2400; 240 MG/30ML; MG/30ML; MG/30ML
15 SUSPENSION ORAL EVERY 6 HOURS PRN
Status: DISCONTINUED | OUTPATIENT
Start: 2024-12-26 | End: 2024-12-27 | Stop reason: HOSPADM

## 2024-12-26 RX ADMIN — PANTOPRAZOLE SODIUM 40 MG: 40 INJECTION, POWDER, FOR SOLUTION INTRAVENOUS at 17:19

## 2024-12-26 RX ADMIN — ESCITALOPRAM 10 MG: 10 TABLET, FILM COATED ORAL at 12:39

## 2024-12-26 RX ADMIN — FAMOTIDINE 20 MG: 10 INJECTION INTRAVENOUS at 06:26

## 2024-12-26 RX ADMIN — NICOTINE 1 PATCH: 21 PATCH TRANSDERMAL at 14:40

## 2024-12-26 RX ADMIN — Medication 10 ML: at 12:40

## 2024-12-26 RX ADMIN — SODIUM CHLORIDE, POTASSIUM CHLORIDE, SODIUM LACTATE AND CALCIUM CHLORIDE 125 ML/HR: 600; 310; 30; 20 INJECTION, SOLUTION INTRAVENOUS at 11:04

## 2024-12-26 RX ADMIN — ATORVASTATIN CALCIUM 20 MG: 20 TABLET, FILM COATED ORAL at 20:31

## 2024-12-26 RX ADMIN — HYDROCODONE BITARTRATE AND ACETAMINOPHEN 1 TABLET: 5; 325 TABLET ORAL at 17:19

## 2024-12-26 RX ADMIN — ALUMINUM HYDROXIDE, MAGNESIUM HYDROXIDE, AND DIMETHICONE 15 ML: 400; 400; 40 SUSPENSION ORAL at 07:18

## 2024-12-26 RX ADMIN — HYDROMORPHONE HYDROCHLORIDE 0.5 MG: 1 INJECTION, SOLUTION INTRAMUSCULAR; INTRAVENOUS; SUBCUTANEOUS at 06:26

## 2024-12-26 RX ADMIN — LIDOCAINE HYDROCHLORIDE 10 ML: 20 SOLUTION ORAL at 11:04

## 2024-12-26 RX ADMIN — CARBAMAZEPINE 300 MG: 200 TABLET ORAL at 20:36

## 2024-12-26 RX ADMIN — Medication 10 ML: at 20:34

## 2024-12-26 RX ADMIN — CARBAMAZEPINE 300 MG: 200 TABLET ORAL at 17:19

## 2024-12-26 RX ADMIN — HYDROMORPHONE HYDROCHLORIDE 0.5 MG: 1 INJECTION, SOLUTION INTRAMUSCULAR; INTRAVENOUS; SUBCUTANEOUS at 09:48

## 2024-12-26 RX ADMIN — PANTOPRAZOLE SODIUM 40 MG: 40 INJECTION, POWDER, FOR SOLUTION INTRAVENOUS at 09:47

## 2024-12-26 RX ADMIN — SODIUM CHLORIDE, POTASSIUM CHLORIDE, SODIUM LACTATE AND CALCIUM CHLORIDE 500 ML: 600; 310; 30; 20 INJECTION, SOLUTION INTRAVENOUS at 09:50

## 2024-12-26 RX ADMIN — IOPAMIDOL 100 ML: 755 INJECTION, SOLUTION INTRAVENOUS at 06:18

## 2024-12-26 RX ADMIN — ONDANSETRON 4 MG: 2 INJECTION INTRAMUSCULAR; INTRAVENOUS at 06:25

## 2024-12-26 RX ADMIN — ASPIRIN 81 MG: 81 TABLET, COATED ORAL at 12:39

## 2024-12-26 NOTE — PLAN OF CARE
Goal Outcome Evaluation:      55 Y/O male patient presents with epigastric pain starting two days ago. Pt says that it got worst this morning. Pt had episodes of vomiting and nausea since yesterday (see Mar). LR started. Will continue to monitor.

## 2024-12-26 NOTE — ED NOTES
Pt came in for abdominal pain 10/10 pain scale, epigastric area. Pt states he was throwing up the whole day on the 24th, got better yesterday. Was able to eat, then woke up with excruciating pain from his abdomen with nausea. Family at bedside. Pt restless and crying in pain. No other concerns made. Call light within reach

## 2024-12-26 NOTE — CASE MANAGEMENT/SOCIAL WORK
Social Drivers of Health     Tobacco Use: High Risk (11/14/2024)    Patient History     Smoking Tobacco Use: Every Day     Smokeless Tobacco Use: Never     Passive Exposure: Not on file   Alcohol Use: Not At Risk (12/26/2024)    AUDIT-C     Frequency of Alcohol Consumption: Never     Average Number of Drinks: Patient does not drink     Frequency of Binge Drinking: Never   Financial Resource Strain: Low Risk  (11/15/2024)    Overall Financial Resource Strain (CARDIA)     Difficulty of Paying Living Expenses: Not hard at all   Food Insecurity: No Food Insecurity (12/26/2024)    Hunger Vital Sign     Worried About Running Out of Food in the Last Year: Never true     Ran Out of Food in the Last Year: Never true   Transportation Needs: No Transportation Needs (12/26/2024)    PRAPARE - Transportation     Lack of Transportation (Medical): No     Lack of Transportation (Non-Medical): No   Physical Activity: Insufficiently Active (12/26/2024)    Exercise Vital Sign     Days of Exercise per Week: 5 days     Minutes of Exercise per Session: 10 min   Stress: No Stress Concern Present (12/26/2024)    [employer] [place] [employer] - Occupational Stress Questionnaire     Feeling of Stress : Not at all   Social Connections: Not At Risk (12/26/2024)    Family and Community Support     Help with Day-to-Day Activities: I don't need any help     Lonely or Isolated: Never   Interpersonal Safety: Not At Risk (12/26/2024)    Abuse Screen     Unsafe at Home or Work/School: no     Feels Threatened by Someone?: no     Does Anyone Keep You from Contacting Others or Doint Things Outside the Home?: no     Physical Sign of Abuse Present: no   Depression: Not at risk (12/26/2024)    PHQ-2     PHQ-2 Score: 0   Housing Stability: Not At Risk (12/26/2024)    Housing Stability     Current Living Arrangements: home     Potentially Unsafe Housing Conditions: none   Utilities: Not At Risk (12/26/2024)    [facility] Utilities     Threatened with loss  of utilities: No   Health Literacy: Not At Risk (12/26/2024)    Education     Help with school or training?: No     Preferred Language: English   Employment: Not At Risk (12/26/2024)    Employment     Do you want help finding or keeping work or a job?: I do not need or want help   Disabilities: Not At Risk (11/15/2024)    Disabilities     Concentrating, Remembering, or Making Decisions Difficulty: no     Doing Errands Independently Difficulty: no

## 2024-12-26 NOTE — H&P
[facility] Observation Unit H&P    Patient Name: Juaquin Pena  : 1970  MRN: [id number]  Primary Care Physician: Provider, No Known  Date of admission: 2024     Patient Care Team:  Provider, No Known as PCP - General          Subjective   History Present Illness     Chief Complaint:   Chief Complaint   Patient presents with    Abdominal Pain         History of Present Illness  Obtained from admitting visit \[facility]\"" on 2020:  54-year-old male with moderate epigastric pain onset 2 hours prior to arrival     24 (postobservation admission):  Patient reports that approximately 2 days prior he began having some epigastric pain in addition to some nausea and vomiting.  At some point he felt he may have seen a small amount of blood in the vomit though this has generally resolved.  Pain has been intermittent and on the day of onset symptoms slowly seem to resolve.  On the following day he attempted some solid p.o. intake and noted that the pain and nausea recurred but no further vomiting was noted and without any other p.o. intake symptoms slowly subsided.  On the day of presentation however he woke with a sharp epigastric and left upper quadrant abdominal pain.  He continues to deny any further vomiting but notes that the pain is intermittent and severe when present.  It is worse when lying on his left side and is tender to palpation in the area.  Pain is described as a sharp type pain.  No diarrhea, fever, dyspnea is reported.  Pain is moderately controlled at the time of my exam though he does note it continues to become severe intermittently without provocation.  Takes 81 mg aspirin daily but denies any other frequent NSAID use and he does not drink alcohol.  He has recently had his seizure medication changed at discharge from this facility        ROS  Review of Systems   Constitutional: Negative.   HENT: Negative.     Eyes: Negative.    Cardiovascular: Negative.    Respiratory: Negative.     Skin:  Negative.    Musculoskeletal: Negative.    Gastrointestinal: Negative.  Positive for abdominal pain, nausea and vomiting. Negative for diarrhea.   Genitourinary: Negative.    Neurological: Negative.    Psychiatric/Behavioral: Negative.           Personal History     Past Medical History:   Past Medical History:   Diagnosis Date    Arthritis     Low back pain     Seizure     Stroke (cerebrum)     2018       Surgical History:      Past Surgical History:   Procedure Laterality Date    FINGER SURGERY      rt  little finger cut off     HERNIA REPAIR             Family History: family history includes COPD in his mother; Diabetes in his mother; Hypertension in his mother. Otherwise pertinent FHx was reviewed and unremarkable.     Social History:  reports that he has been smoking cigarettes. He has never used smokeless tobacco. He reports current alcohol use. He reports current drug use. Drug: Marijuana.      Medications:  Prior to Admission medications    Medication Sig Start Date End Date Taking? Authorizing Provider   aspirin 81 MG EC tablet Take 1 tablet by mouth Daily. 11/18/24   Brammell, Timothy Duane, MD   atorvastatin (LIPITOR) 20 MG tablet Take 1 tablet by mouth Every Night. 11/18/24   Brammell, Timothy Duane, MD   carBAMazepine (TEGretol) 200 MG tablet Take 1.5 tablets by mouth 3 (Three) Times a Day. 12/6/24   Brammell, Timothy Duane, MD   escitalopram (LEXAPRO) 10 MG tablet Take 1 tablet by mouth Daily. 11/18/24   Brammell, Timothy Duane, MD   carBAMazepine (TEGretol) 200 MG tablet Take 1 tablet by mouth 2 (Two) Times a Day for 4 days, THEN 1 tablet 3 times a day for 7 days, THEN 1.5 tablets twice daily and 1 tablet in the evening for 7 days, THEN 1.5 tablets 3 times a day 11/18/24 12/26/24  Brammell, Timothy Duane, MD   carBAMazepine (TEGretol) 200 MG tablet Take 1 tablet by mouth 3 (Three) Times a Day for 21 doses. 11/22/24 12/26/24  Brammell, Timothy Duane, MD   carBAMazepine (TEGretol) 200 MG tablet Take  1.5 tablets by mouth 2 (Two) Times a Day AND 1 tablet Daily. Do all this for 7 days. 11/29/24 12/26/24  Brammell, Timothy Duane, MD       Allergies:    Allergies   Allergen Reactions    Morphine Unknown - High Severity     Unknown, pt mother told him he had a bad reaction as a child        Objective   Objective     Vital Signs  Temp:  [97.8 °F (36.6 °C)-98.1 °F (36.7 °C)] 98.1 °F (36.7 °C)  Heart Rate:  [57-78] 59  Resp:  [16-19] 16  BP: (115-165)/(64-93) 126/81  SpO2:  [96 %-98 %] 98 %  on   ;   Device (Oxygen Therapy): room air  Body mass index is 25.19 kg/m².    Physical Exam  Vitals reviewed.   Constitutional:       General: He is not in acute distress.     Appearance: Normal appearance. He is normal weight. He is not ill-appearing, toxic-appearing or diaphoretic.   HENT:      Head: Normocephalic.      Right Ear: External ear normal.      Left Ear: External ear normal.      Nose: Nose normal.      Mouth/Throat:      Mouth: Mucous membranes are moist.   Eyes:      Extraocular Movements: Extraocular movements intact.   Cardiovascular:      Rate and Rhythm: Normal rate and regular rhythm.      Pulses: Normal pulses.      Heart sounds: Normal heart sounds.   Pulmonary:      Effort: Pulmonary effort is normal.      Breath sounds: Normal breath sounds.   Abdominal:      General: Bowel sounds are normal.      Palpations: Abdomen is soft.      Tenderness: There is abdominal tenderness.   Musculoskeletal:         General: Normal range of motion.      Cervical back: Normal range of motion.      Right lower leg: No edema.      Left lower leg: No edema.   Skin:     General: Skin is warm and dry.      Capillary Refill: Capillary refill takes less than 2 seconds.   Neurological:      General: No focal deficit present.      Mental Status: He is alert and oriented to person, place, and time.   Psychiatric:         Mood and Affect: Mood normal.         Behavior: Behavior normal.         Thought Content: Thought content normal.          Judgment: Judgment normal.     Results Review:  I have personally reviewed most recent lab results and radiology images and interpretations and agree with findings, most notably: CMP, CBC, lipase, UA, CT of abdomen and pelvis and ultrasound of gallbladder.    Results from last 7 days   Lab Units 12/26/24  0554   WBC 10*3/mm3 9.55   HEMOGLOBIN g/dL 14.3   HEMATOCRIT % 42.0   PLATELETS 10*3/mm3 325     Results from last 7 days   Lab Units 12/26/24  0554   SODIUM mmol/L 143   POTASSIUM mmol/L 3.8   CHLORIDE mmol/L 104   CO2 mmol/L 30.4*   BUN mg/dL 13   CREATININE mg/dL 1.05   GLUCOSE mg/dL 103*   CALCIUM mg/dL 9.2   ALK PHOS U/L 95   ALT (SGPT) U/L 16   AST (SGOT) U/L 26     Estimated Creatinine Clearance: 104 mL/min (by C-G formula based on SCr of 1.05 mg/dL).  Brief Urine Lab Results  (Last result in the past 365 days)        Color   Clarity   Blood   Leuk Est   Nitrite   Protein   CREAT   Urine HCG        12/26/24 0635 Yellow   Clear   Negative   Negative   Negative   Negative                   Microbiology Results (last 10 days)       ** No results found for the last 240 hours. **            ECG/EMG Results (most recent)       None                Results for orders placed during the hospital encounter of 11/14/24    Adult Transthoracic Echo Complete W/ Cont if Necessary Per Protocol    Interpretation Summary    Left ventricular systolic function is normal. Estimated left ventricular EF = 60% Left ventricular ejection fraction appears to be 56 - 60%.    Left ventricular wall thickness is consistent with mild concentric hypertrophy.    Left ventricular diastolic function was normal.    Transthoracic echocardiography reveals EF of 60% and no effusion    Electronically signed by Dnan Trevino MD, 11/15/24, 7:00 PM EST.      US Gallbladder    Result Date: 12/26/2024  Impression: No significant underlying abnormality. Electronically Signed: Horacio Snyder MD  12/26/2024 8:13 AM EST  Workstation ID:  [id number]    CT Abdomen Pelvis With Contrast    Result Date: 12/26/2024  1.No acute intra-abdominal or intrapelvic abnormality. 2.Other incidental findings as above. Electronically Signed: López Bull MD  12/26/2024 6:50 AM EST  Workstation ID: [id number]       Estimated Creatinine Clearance: 104 mL/min (by C-G formula based on SCr of 1.05 mg/dL).    Assessment & Plan   Assessment/Plan       Active Hospital Problems    Diagnosis  POA    **Acute epigastric pain [R10.13]  Yes      Resolved Hospital Problems   No resolved problems to display.       Abdominal pain with nausea and vomiting        Lab Results   Component Value Date     WBC 9.55 12/26/2024     AST 26 12/26/2024     ALT 16 12/26/2024     ALKPHOS 95 12/26/2024     BILITOT 0.2 12/26/2024     LIPASE 33 12/26/2024   -UA showed an elevated specific gravity of 1.046 but was otherwise unremarkable  -CT of abdomen and pelvis: No acute abnormality with some small lymph nodes within the mesentery noted is likely reactive in nature with no suspicious fluid collections noted  -Ultrasound of gallbladder showed no significant underlying abnormalities  -In the ED patient was given famotidine, Maalox, Dilaudid and Zofran  -Check troponin and EKG  -LR bolus followed by infusion ordered along with IV pantoprazole and viscous lidocaine x 1  -NPO diet for now, will advance based on clinical course  -Monitor CMP while admitted     History of seizures  -Continue carbamazepine  -Seizure precautions  -Ativan ordered x 1 as needed with instructions to contact provider if patient experiences seizure     History of CVA  -Continue aspirin and statin     Depression/anxiety  -Lexapro          VTE Prophylaxis - No Active Pharmacologic or Mechanical VTE Prophylaxis AND No VTE Prophylaxis Contraindications Documented   - Select Appropriate VTE ProphylaxisActive VTE Prophylaxis  Mechanical:        Start        Signed and Held  Maintain Sequential Compression Device  Continuous                           Select Pharmacologic VTE Prophylaxis if Desired & Appropriate      CODE STATUS:    Code Status and Medical Interventions: CPR (Attempt to Resuscitate); Full Support   Ordered at: 12/26/24 0952     Code Status (Patient has no pulse and is not breathing):    CPR (Attempt to Resuscitate)     Medical Interventions (Patient has pulse or is breathing):    Full Support       This patient has been examined wearing personal protective equipment.     I discussed the patient's findings and my recommendations with patient and nursing staff.      Signature:Electronically signed by Steven Bell PA-C, 12/26/24, 10:48 AM EST.

## 2024-12-26 NOTE — CASE MANAGEMENT/SOCIAL WORK
Discharge Planning Assessment   [facility]     Patient Name: Jauquin Pena  MRN: [id number]  Today's Date: 12/26/2024    Admit Date: 12/26/2024    Plan: Return home   Discharge Needs Assessment       Row Name 12/26/24 0922       Living Environment    People in Home spouse    Name(s) of People in Home Ledy-Spouse    Current Living Arrangements home    Potentially Unsafe Housing Conditions none    In the past 12 months has the electric, gas, oil, or water company threatened to shut off services in your home? No    Primary Care Provided by self    Provides Primary Care For no one    Family Caregiver if Needed spouse    Quality of Family Relationships helpful;involved;supportive    Able to Return to Prior Arrangements yes       Resource/Environmental Concerns    Resource/Environmental Concerns none    Transportation Concerns none       Transportation Needs    In the past 12 months, has lack of transportation kept you from medical appointments or from getting medications? no    In the past 12 months, has lack of transportation kept you from meetings, work, or from getting things needed for daily living? No       Food Insecurity    Within the past 12 months, you worried that your food would run out before you got the money to buy more. Never true       Transition Planning    Patient/Family Anticipates Transition to home with family    Patient/Family Anticipated Services at Transition none    Transportation Anticipated family or friend will provide  spouse       Discharge Needs Assessment    Equipment Currently Used at Home none    Concerns to be Addressed discharge planning    Do you want help finding or keeping work or a job? I do not need or want help    Do you want help with school or training? For example, starting or completing job training or getting a high school diploma, GED or equivalent No                   Discharge Plan       Row Name 12/26/24 0926       Plan    Plan Return home    Plan Comments Spoke with  patient and spouse at bedside, states IADL's, confirmed PCP and pharmacy. Denies transportaton or medication coverage issues. DC Barrier: Pain control                  Continued Care and Services - Admitted Since 12/26/2024    No active coordination exists for this encounter.       Expected Discharge Date and Time       Expected Discharge Date Expected Discharge Time    Dec 27, 2024            Demographic Summary       Row Name 12/26/24 0921       General Information    Admission Type observation    Arrived From home    Referral Source patient;other (see comments)  spouse    Reason for Consult discharge planning    Preferred Language English                Functional Status       Row Name 12/26/24 0922       Functional Status    Usual Activity Tolerance good    Current Activity Tolerance good       Physical Activity    On average, how many days per week do you engage in moderate to strenuous exercise (like a brisk walk)? 5 days    On average, how many minutes do you engage in exercise at this level? 10 min    Number of minutes of exercise per week 50       Functional Status, IADL    Medications independent    Meal Preparation independent    Housekeeping independent    Laundry independent    Shopping independent    If for any reason you need help with day-to-day activities such as bathing, preparing meals, shopping, managing finances, etc., do you get the help you need? I don't need any help       Mental Status    General Appearance WDL WDL        Met with patient in room wearing PPE: mask, face shield/goggles, gloves, gown.      Maintained distance greater than six feet and spent less than 15 minutes in the room.     Zoë Lebron RN

## 2024-12-26 NOTE — ED PROVIDER NOTES
Subjective   History of Present Illness  54-year-old male with moderate epigastric pain onset 2 hours prior to arrival      Review of Systems   Respiratory:  Negative for shortness of breath.    Cardiovascular:  Negative for chest pain.   Gastrointestinal:  Positive for abdominal pain, nausea and vomiting.       Past Medical History:   Diagnosis Date    Arthritis     Low back pain     Seizure     Stroke (cerebrum)     2018       Allergies   Allergen Reactions    Morphine Unknown - High Severity     Unknown, pt mother told him he had a bad reaction as a child        Past Surgical History:   Procedure Laterality Date    FINGER SURGERY      rt  little finger cut off     HERNIA REPAIR         Family History   Problem Relation Age of Onset    Hypertension Mother     COPD Mother     Diabetes Mother        Social History     Socioeconomic History    Marital status:    Tobacco Use    Smoking status: Every Day     Types: Cigarettes    Smokeless tobacco: Never    Tobacco comments:     3 CIGS A DAY   Vaping Use    Vaping status: Never Used   Substance and Sexual Activity    Alcohol use: Yes     Comment:  1 to 2  beers a week    Drug use: Yes     Types: Marijuana     Comment:  has used  last use 4/13/21    Sexual activity: Defer           Objective   Physical Exam  Constitutional:       General: He is in acute distress.      Appearance: He is well-developed.   HENT:      Head: Normocephalic and atraumatic.   Cardiovascular:      Rate and Rhythm: Normal rate and regular rhythm.      Heart sounds: Normal heart sounds.   Pulmonary:      Effort: Pulmonary effort is normal.      Breath sounds: Normal breath sounds.   Abdominal:      General: Bowel sounds are normal.      Palpations: Abdomen is soft.      Comments: Epigastric tenderness, no rebound or guarding   Skin:     General: Skin is warm and dry.      Capillary Refill: Capillary refill takes less than 2 seconds.   Neurological:      General: No focal deficit present.       Mental Status: He is alert and oriented to person, place, and time.   Psychiatric:         Mood and Affect: Mood normal.         Behavior: Behavior normal.         Procedures           ED Course                                                       Medical Decision Making  Patient appears well still with epigastric tenderness to moderate ongoing abdominal pain, will place in ED observation, will obtain gallbladder ultrasound    Problems Addressed:  Acute abdominal pain: complicated acute illness or injury    Amount and/or Complexity of Data Reviewed  Labs: ordered.  Radiology: ordered.    Risk  OTC drugs.  Prescription drug management.  Decision regarding hospitalization.        Final diagnoses:   Acute abdominal pain       ED Disposition  ED Disposition       ED Disposition   Decision to Admit    Condition   --    Comment   --               No follow-up provider specified.       Medication List      No changes were made to your prescriptions during this visit.            Ayan Galvez MD  12/26/24 0734

## 2024-12-27 VITALS
DIASTOLIC BLOOD PRESSURE: 77 MMHG | BODY MASS INDEX: 25.05 KG/M2 | WEIGHT: 201.5 LBS | SYSTOLIC BLOOD PRESSURE: 128 MMHG | HEIGHT: 75 IN | HEART RATE: 57 BPM | RESPIRATION RATE: 14 BRPM | OXYGEN SATURATION: 98 % | TEMPERATURE: 97.5 F

## 2024-12-27 LAB
ALBUMIN SERPL-MCNC: 3.4 G/DL (ref 3.5–5.2)
ALBUMIN/GLOB SERPL: 1.4 G/DL
ALP SERPL-CCNC: 88 U/L (ref 39–117)
ALT SERPL W P-5'-P-CCNC: 16 U/L (ref 1–41)
ANION GAP SERPL CALCULATED.3IONS-SCNC: 9.6 MMOL/L (ref 5–15)
AST SERPL-CCNC: 21 U/L (ref 1–40)
BASOPHILS # BLD AUTO: 0.04 10*3/MM3 (ref 0–0.2)
BASOPHILS NFR BLD AUTO: 0.7 % (ref 0–1.5)
BILIRUB SERPL-MCNC: <0.2 MG/DL (ref 0–1.2)
BUN SERPL-MCNC: 9 MG/DL (ref 6–20)
BUN/CREAT SERPL: 10.2 (ref 7–25)
CALCIUM SPEC-SCNC: 8.7 MG/DL (ref 8.6–10.5)
CHLORIDE SERPL-SCNC: 102 MMOL/L (ref 98–107)
CO2 SERPL-SCNC: 26.4 MMOL/L (ref 22–29)
CREAT SERPL-MCNC: 0.88 MG/DL (ref 0.76–1.27)
DEPRECATED RDW RBC AUTO: 43.7 FL (ref 37–54)
EGFRCR SERPLBLD CKD-EPI 2021: 102.2 ML/MIN/1.73
EOSINOPHIL # BLD AUTO: 0.18 10*3/MM3 (ref 0–0.4)
EOSINOPHIL NFR BLD AUTO: 2.9 % (ref 0.3–6.2)
ERYTHROCYTE [DISTWIDTH] IN BLOOD BY AUTOMATED COUNT: 12.8 % (ref 12.3–15.4)
GLOBULIN UR ELPH-MCNC: 2.5 GM/DL
GLUCOSE SERPL-MCNC: 92 MG/DL (ref 65–99)
HCT VFR BLD AUTO: 40.1 % (ref 37.5–51)
HGB BLD-MCNC: 12.9 G/DL (ref 13–17.7)
IMM GRANULOCYTES # BLD AUTO: 0.02 10*3/MM3 (ref 0–0.05)
IMM GRANULOCYTES NFR BLD AUTO: 0.3 % (ref 0–0.5)
LYMPHOCYTES # BLD AUTO: 2.49 10*3/MM3 (ref 0.7–3.1)
LYMPHOCYTES NFR BLD AUTO: 40.7 % (ref 19.6–45.3)
MAGNESIUM SERPL-MCNC: 1.9 MG/DL (ref 1.6–2.6)
MCH RBC QN AUTO: 29.7 PG (ref 26.6–33)
MCHC RBC AUTO-ENTMCNC: 32.2 G/DL (ref 31.5–35.7)
MCV RBC AUTO: 92.4 FL (ref 79–97)
MONOCYTES # BLD AUTO: 0.98 10*3/MM3 (ref 0.1–0.9)
MONOCYTES NFR BLD AUTO: 16 % (ref 5–12)
NEUTROPHILS NFR BLD AUTO: 2.41 10*3/MM3 (ref 1.7–7)
NEUTROPHILS NFR BLD AUTO: 39.4 % (ref 42.7–76)
NRBC BLD AUTO-RTO: 0 /100 WBC (ref 0–0.2)
PLATELET # BLD AUTO: 331 10*3/MM3 (ref 140–450)
PMV BLD AUTO: 9.7 FL (ref 6–12)
POTASSIUM SERPL-SCNC: 3.8 MMOL/L (ref 3.5–5.2)
PROT SERPL-MCNC: 5.9 G/DL (ref 6–8.5)
RBC # BLD AUTO: 4.34 10*6/MM3 (ref 4.14–5.8)
SODIUM SERPL-SCNC: 138 MMOL/L (ref 136–145)
WBC NRBC COR # BLD AUTO: 6.12 10*3/MM3 (ref 3.4–10.8)

## 2024-12-27 PROCEDURE — G0378 HOSPITAL OBSERVATION PER HR: HCPCS

## 2024-12-27 PROCEDURE — 80053 COMPREHEN METABOLIC PANEL: CPT | Performed by: PHYSICIAN ASSISTANT

## 2024-12-27 PROCEDURE — 83735 ASSAY OF MAGNESIUM: CPT | Performed by: PHYSICIAN ASSISTANT

## 2024-12-27 PROCEDURE — 85025 COMPLETE CBC W/AUTO DIFF WBC: CPT | Performed by: PHYSICIAN ASSISTANT

## 2024-12-27 PROCEDURE — 96376 TX/PRO/DX INJ SAME DRUG ADON: CPT

## 2024-12-27 RX ORDER — SUCRALFATE 1 G/1
1 TABLET ORAL
Qty: 90 TABLET | Refills: 0 | Status: SHIPPED | OUTPATIENT
Start: 2024-12-27 | End: 2025-01-26

## 2024-12-27 RX ORDER — PANTOPRAZOLE SODIUM 40 MG/1
40 TABLET, DELAYED RELEASE ORAL DAILY
Qty: 30 TABLET | Refills: 0 | Status: SHIPPED | OUTPATIENT
Start: 2024-12-27 | End: 2025-01-26

## 2024-12-27 RX ADMIN — Medication 10 ML: at 08:23

## 2024-12-27 RX ADMIN — PANTOPRAZOLE SODIUM 40 MG: 40 INJECTION, POWDER, FOR SOLUTION INTRAVENOUS at 08:20

## 2024-12-27 RX ADMIN — ESCITALOPRAM 10 MG: 10 TABLET, FILM COATED ORAL at 08:22

## 2024-12-27 RX ADMIN — CARBAMAZEPINE 300 MG: 200 TABLET ORAL at 08:22

## 2024-12-27 RX ADMIN — ASPIRIN 81 MG: 81 TABLET, COATED ORAL at 10:30

## 2024-12-27 NOTE — DISCHARGE SUMMARY
[facility]    Provider, No Known    CHIEF COMPLAINT:     Abdominal pain    HISTORY OF PRESENT ILLNESS:    Obtained from admitting visit [facility] on 12/26/2020:  54-year-old male with moderate epigastric pain onset 2 hours prior to arrival    12/26/24 (postobservation admission):  Patient reports that approximately 2 days prior he began having some epigastric pain in addition to some nausea and vomiting.  At some point he felt he may have seen a small amount of blood in the vomit though this has generally resolved.  Pain has been intermittent and on the day of onset symptoms slowly seem to resolve.  On the following day he attempted some solid p.o. intake and noted that the pain and nausea recurred but no further vomiting was noted and without any other p.o. intake symptoms slowly subsided.  On the day of presentation however he woke with a sharp epigastric and left upper quadrant abdominal pain.  He continues to deny any further vomiting but notes that the pain is intermittent and severe when present.  It is worse when lying on his left side and is tender to palpation in the area.  Pain is described as a sharp type pain.  No diarrhea, fever, dyspnea is reported.  Pain is moderately controlled at the time of my exam though he does note it continues to become severe intermittently without provocation.  Takes 81 mg aspirin daily but denies any other frequent NSAID use and he does not drink alcohol.  He has recently had his seizure medication changed at discharge from this facility    12/27/2024:  Patient reports symptoms seem to be improving and he is tolerating clear liquid diet without significant complications wishing to advance to regular.  No new or acute symptoms are noted.          Past Medical History:   Diagnosis Date    Arthritis     Low back pain     Seizure     Stroke (cerebrum)     2018     Past Surgical History:   Procedure Laterality Date    FINGER SURGERY      rt  little finger cut  off     HERNIA REPAIR       Family History   Problem Relation Age of Onset    Hypertension Mother     COPD Mother     Diabetes Mother      Social History     Tobacco Use    Smoking status: Every Day     Types: Cigarettes    Smokeless tobacco: Never    Tobacco comments:     3 CIGS A DAY   Vaping Use    Vaping status: Never Used   Substance Use Topics    Alcohol use: Yes     Comment:  1 to 2  beers a week    Drug use: Yes     Types: Marijuana     Comment:  has used  last use 4/13/21     Medications Prior to Admission   Medication Sig Dispense Refill Last Dose/Taking    aspirin 81 MG EC tablet Take 1 tablet by mouth Daily. 30 tablet 2 12/25/2024    carBAMazepine (TEGretol) 200 MG tablet Take 1.5 tablets by mouth 3 (Three) Times a Day. 90 tablet 1 Past Week    escitalopram (LEXAPRO) 10 MG tablet Take 1 tablet by mouth Daily. 20 tablet 0 Past Week    atorvastatin (LIPITOR) 20 MG tablet Take 1 tablet by mouth Every Night. 30 tablet 1      Allergies:  Morphine      There is no immunization history on file for this patient.        REVIEW OF SYSTEMS:    Review of Systems   Constitutional: Negative.   HENT: Negative.     Eyes: Negative.    Cardiovascular: Negative.    Respiratory: Negative.     Skin: Negative.    Musculoskeletal: Negative.    Gastrointestinal:  Positive for abdominal pain, nausea and vomiting. Negative for diarrhea.   Genitourinary: Negative.    Neurological: Negative.    Psychiatric/Behavioral: Negative.         Vital Signs  Temp:  [97.1 °F (36.2 °C)-98.1 °F (36.7 °C)] 97.5 °F (36.4 °C)  Heart Rate:  [53-61] 57  Resp:  [12-16] 14  BP: (112-145)/(73-86) 128/77          Physical Exam:  Physical Exam  Vitals reviewed.   Constitutional:       General: He is not in acute distress.     Appearance: Normal appearance. He is normal weight. He is not ill-appearing, toxic-appearing or diaphoretic.   HENT:      Head: Normocephalic.      Right Ear: External ear normal.      Left Ear: External ear normal.      Nose: Nose  normal.      Mouth/Throat:      Mouth: Mucous membranes are moist.   Eyes:      Extraocular Movements: Extraocular movements intact.   Cardiovascular:      Rate and Rhythm: Normal rate and regular rhythm.      Pulses: Normal pulses.      Heart sounds: Normal heart sounds.   Pulmonary:      Effort: Pulmonary effort is normal.      Breath sounds: Normal breath sounds.   Abdominal:      General: Bowel sounds are normal.      Palpations: Abdomen is soft.      Tenderness: There is abdominal tenderness.   Musculoskeletal:         General: Normal range of motion.      Cervical back: Normal range of motion.      Right lower leg: No edema.      Left lower leg: No edema.   Skin:     General: Skin is warm and dry.      Capillary Refill: Capillary refill takes less than 2 seconds.   Neurological:      General: No focal deficit present.      Mental Status: He is alert and oriented to person, place, and time.   Psychiatric:         Mood and Affect: Mood normal.         Behavior: Behavior normal.         Thought Content: Thought content normal.         Judgment: Judgment normal.         Emotional Behavior:   Normal   Debilities:  None  Results Review:    I reviewed the patient's new clinical results.  Lab Results (most recent)       Procedure Component Value Units Date/Time    Urinalysis With Culture If Indicated - Urine, Clean Catch [[id number]]  (Abnormal) Collected: 12/26/24 0635    Specimen: Urine, Clean Catch Updated: 12/26/24 0657     Color, UA Yellow     Appearance, UA Clear     pH, UA 6.0     Specific Gravity, UA 1.046     Glucose, UA Negative     Ketones, UA Negative     Bilirubin, UA Negative     Blood, UA Negative     Protein, UA Negative     Leuk Esterase, UA Negative     Nitrite, UA Negative     Urobilinogen, UA 1.0 E.U./dL    Narrative:      In absence of clinical symptoms, the presence of pyuria, bacteria, and/or nitrites on the urinalysis result does not correlate with infection.  Urine microscopic not indicated.     Comprehensive Metabolic Panel [[id number]]  (Abnormal) Collected: 12/26/24 0554    Specimen: Blood Updated: 12/26/24 0628     Glucose 103 mg/dL      BUN 13 mg/dL      Creatinine 1.05 mg/dL      Sodium 143 mmol/L      Potassium 3.8 mmol/L      Chloride 104 mmol/L      CO2 30.4 mmol/L      Calcium 9.2 mg/dL      Total Protein 6.4 g/dL      Albumin 3.8 g/dL      ALT (SGPT) 16 U/L      AST (SGOT) 26 U/L      Alkaline Phosphatase 95 U/L      Total Bilirubin 0.2 mg/dL      Globulin 2.6 gm/dL      A/G Ratio 1.5 g/dL      BUN/Creatinine Ratio 12.4     Anion Gap 8.6 mmol/L      eGFR 84.4 mL/min/1.73     Narrative:      GFR Categories in Chronic Kidney Disease (CKD)      GFR Category          GFR (mL/min/1.73)    Interpretation  G1                     90 or greater         Normal or high (1)  G2                      60-89                Mild decrease (1)  G3a                   45-59                Mild to moderate decrease  G3b                   30-44                Moderate to severe decrease  G4                    15-29                Severe decrease  G5                    14 or less           Kidney failure          (1)In the absence of evidence of kidney disease, neither GFR category G1 or G2 fulfill the criteria for CKD.    eGFR calculation 2021 CKD-EPI creatinine equation, which does not include race as a factor    Lipase [[id number]]  (Normal) Collected: 12/26/24 0554    Specimen: Blood Updated: 12/26/24 0628     Lipase 33 U/L     CBC & Differential [[id number]]  (Abnormal) Collected: 12/26/24 0554    Specimen: Blood Updated: 12/26/24 0600    Narrative:      The following orders were created for panel order CBC & Differential.  Procedure                               Abnormality         Status                     ---------                               -----------         ------                     CBC Auto Differential[[id number]]        Abnormal            Final result                 Please view results for these tests  on the individual orders.    CBC Auto Differential [[id number]]  (Abnormal) Collected: 12/26/24 0554    Specimen: Blood Updated: 12/26/24 0600     WBC 9.55 10*3/mm3      RBC 4.58 10*6/mm3      Hemoglobin 14.3 g/dL      Hematocrit 42.0 %      MCV 91.7 fL      MCH 31.2 pg      MCHC 34.0 g/dL      RDW 12.8 %      RDW-SD 42.5 fl      MPV 9.3 fL      Platelets 325 10*3/mm3      Neutrophil % 66.2 %      Lymphocyte % 19.5 %      Monocyte % 11.6 %      Eosinophil % 1.9 %      Basophil % 0.3 %      Immature Grans % 0.5 %      Neutrophils, Absolute 6.32 10*3/mm3      Lymphocytes, Absolute 1.86 10*3/mm3      Monocytes, Absolute 1.11 10*3/mm3      Eosinophils, Absolute 0.18 10*3/mm3      Basophils, Absolute 0.03 10*3/mm3      Immature Grans, Absolute 0.05 10*3/mm3      nRBC 0.0 /100 WBC             Imaging Results (Most Recent)       Procedure Component Value Units Date/Time    US Gallbladder [[id number]] Collected: 12/26/24 0810     Updated: 12/26/24 0815    Narrative:      US GALLBLADDER    Date of Exam: 12/26/2024 7:49 AM EST    Indication: epigastric pain.    Comparison: CT abdomen pelvis December 26, 2024    Technique: Grayscale and color Doppler ultrasound evaluation of the right upper quadrant was performed.      Findings:  The liver is homogeneous in echotexture. Doppler waveform for the portal vein and hepatic vein reveals patency of these vessels. There are no gallstones within the gallbladder. The gallbladder wall is not thickened. Common bile duct measures 0.28 cm. The   visualized right kidney does not appear unusual. Pancreas not well defined.      Impression:      Impression:  No significant underlying abnormality.        Electronically Signed: Horacio Snyder MD    12/26/2024 8:13 AM EST    Workstation ID: [id number]    CT Abdomen Pelvis With Contrast [[id number]] Collected: 12/26/24 0641     Updated: 12/26/24 0655    Narrative:      CT ABDOMEN PELVIS W CONTRAST    Date of Exam: 12/26/2024 6:02 AM EST    Indication:  acute epigastric pain.    Comparison: None available.    Technique: Axial CT images were obtained of the abdomen and pelvis following the uneventful intravenous administration of iodinated contrast. Sagittal and coronal reconstructions were performed.  Automated exposure control and iterative reconstruction   methods were used.        FINDINGS:    Abdomen/Pelvis:    Lower Chest: Limited imaging lung bases demonstrate some linear opacities compatible with atelectasis, scarring at the left base.    No free air is noted below the diaphragm.    Organs: The gallbladder appears grossly unremarkable in appearance. No dilation of the biliary collecting system is appreciated. The liver, pancreas, spleen, kidneys and adrenal glands are unremarkable in appearance    GI/Bowel: Stomach appears grossly unremarkable in appearance. The small bowel demonstrates no acute abnormality. Small lymph nodes within the mesentery are likely reactive in nature. No suspicious fluid collections are noted.    The ileocecal valve is grossly unremarkable in appearance. The appendix is visualized and appears within normal limits. The colon demonstrates no acute abnormality    Pelvis: The bladder is incompletely distended and otherwise grossly unremarkable in appearance. The prostate and pelvic structures demonstrate no acute abnormality. No suspicious pelvic adenopathy or fluid collections noted.    Peritoneum/Retroperitoneum: Atherosclerotic changes are noted of the aorta which is normal in caliber. No suspicious retroperitoneal adenopathy is noted    Bones/Soft Tissues: Degenerative changes are noted of the spine. No acute abnormality noted. This is most pronounced at L5-S1.      Impression:      1.No acute intra-abdominal or intrapelvic abnormality.  2.Other incidental findings as above.        Electronically Signed: López Bull MD    12/26/2024 6:50 AM EST    Workstation ID: [id number]          reviewed    ECG/EMG Results (most recent)        Procedure Component Value Units Date/Time    ECG 12 Lead Other; Epigastric pain [[id number]] Collected: 12/26/24 1153     Updated: 12/26/24 1907     QT Interval 403 ms      QTC Interval 385 ms     Narrative:      HEART RATE=55  bpm  RR Sgbnjlpy=1413  ms  NC Elznzuaa=314  ms  P Horizontal Axis=54  deg  P Front Axis=-7  deg  QRSD Uyeyytsg=884  ms  QT Kchsmsvh=688  ms  MXdO=959  ms  QRS Axis=7  deg  T Wave Axis=26  deg  - OTHERWISE NORMAL ECG -  Sinus bradycardia  When compared with ECG of 14-Nov-2024 16:08:18,  No significant change  Electronically Signed By: Dann Trevino (RATNA) 2024-12-26 19:06:06  Date and Time of Study:2024-12-26 11:53:48    ECG 12 Lead Other; Epigastric pain [[id number]] Collected: 12/26/24 1125     Updated: 12/26/24 1908     QT Interval 427 ms      QTC Interval 399 ms     Narrative:      HEART RATE=52  bpm  RR Zwkwbhnx=1562  ms  NC Interval=  ms  P Horizontal Axis=  deg  P Front Axis=  deg  QRSD Ziglujkp=695  ms  QT Xmcrdded=187  ms  OAgV=872  ms  QRS Axis=6  deg  T Wave Axis=26  deg  - ABNORMAL ECG -  Sinus rhythm with artifacts and no significant change compared to previous EKG  Electronically Signed By: Dann Trevino (RATNA) 2024-12-26 19:06:32  Date and Time of Study:2024-12-26 11:25:40          not reviewed        Results for orders placed during the hospital encounter of 11/14/24    Adult Transthoracic Echo Complete W/ Cont if Necessary Per Protocol    Interpretation Summary    Left ventricular systolic function is normal. Estimated left ventricular EF = 60% Left ventricular ejection fraction appears to be 56 - 60%.    Left ventricular wall thickness is consistent with mild concentric hypertrophy.    Left ventricular diastolic function was normal.    Transthoracic echocardiography reveals EF of 60% and no effusion    Electronically signed by Dann Trevino MD, 11/15/24, 7:00 PM EST.      Microbiology Results (last 10 days)       ** No results found for the last 240 hours. **             Assessment & Plan     Acute epigastric pain       Abdominal pain with nausea and vomiting  Lab Results   Component Value Date    WBC 6.12 12/27/2024    AST 21 12/27/2024    ALT 16 12/27/2024    ALKPHOS 88 12/27/2024    BILITOT <0.2 12/27/2024    LIPASE 33 12/26/2024   -UA showed an elevated specific gravity of 1.046 but was otherwise unremarkable  -CT of abdomen and pelvis: No acute abnormality with some small lymph nodes within the mesentery noted is likely reactive in nature with no suspicious fluid collections noted  -Ultrasound of gallbladder showed no significant underlying abnormalities  -In the ED patient was given famotidine, Maalox, Dilaudid and Zofran  -Check troponin and EKG  -LR bolus followed by infusion ordered along with IV pantoprazole and viscous lidocaine x 1  -NPO diet for now, will advance based on clinical course  -Monitor CMP while admitted    History of seizures  -Continue carbamazepine  -Seizure precautions  -Ativan ordered x 1 as needed with instructions to contact provider if patient experiences seizure    History of CVA  -Continue aspirin and statin    Depression/anxiety  -Lexapro    I discussed the patients findings and my recommendations with patient and nursing staff.     Discharge Diagnosis:      Acute epigastric pain      Hospital Course  Patient is a 54 y.o. male presented with abdominal pain with nausea and vomiting with an HPI noted above.  CMP was assessed with serum CO2 elevated at 30.4 remainder being generally unremarkable.  CBC was also within normal limits and lipase was found to be 33.  UA showed elevated specific gravity 1.046 with no other abnormalities and CT of abdomen and pelvis was ordered in the ED which showed no acute findings though some possibly reactive lymph nodes were noted.  Ultrasound of gallbladder was ordered in the ED and reported is generally unremarkable.  Serial troponins were ordered and found to be 7, 16.  EKG showed sinus bradycardia at 55  without obvious acute changes and a QTc of 385 ms.  He was given IV famotidine as well as Maalox, Dilaudid and Zofran in the ED.  Following his admission patient was given an LR bolus followed by infusion as well as IV pantoprazole.  Diet was advanced which patient was able to tolerate Carafate will also be prescribed at discharge in addition to p.o. pantoprazole.  At this time patient is felt to be in good condition for discharge with close follow-up with his PCP as well as GI on an outpatient basis.  His full testing/results and plan were discussed with patient along with concerning/alarm symptoms for which to call 911/return to the ED.  All questions were answered and he verbalizes his understanding and agreement.    Past Medical History:     Past Medical History:   Diagnosis Date    Arthritis     Low back pain     Seizure     Stroke (cerebrum)     2018       Past Surgical History:     Past Surgical History:   Procedure Laterality Date    FINGER SURGERY      rt  little finger cut off     HERNIA REPAIR         Social History:   Social History     Socioeconomic History    Marital status:    Tobacco Use    Smoking status: Every Day     Types: Cigarettes    Smokeless tobacco: Never    Tobacco comments:     3 CIGS A DAY   Vaping Use    Vaping status: Never Used   Substance and Sexual Activity    Alcohol use: Yes     Comment:  1 to 2  beers a week    Drug use: Yes     Types: Marijuana     Comment:  has used  last use 4/13/21    Sexual activity: Defer       Procedures Performed         Consults:   Consults       No orders found for last 30 day(s).            Condition on Discharge:     Stable    Discharge Disposition  Home or Self Care    Discharge Medications     Discharge Medications        New Medications        Instructions Start Date   pantoprazole 40 MG EC tablet  Commonly known as: PROTONIX   40 mg, Oral, Daily      sucralfate 1 g tablet  Commonly known as: Carafate   1 g, Oral, 3 Times Daily With Meals              Continue These Medications        Instructions Start Date   Aspirin Low Dose 81 MG EC tablet  Generic drug: aspirin   81 mg, Oral, Daily      atorvastatin 20 MG tablet  Commonly known as: LIPITOR   20 mg, Oral, Nightly      carBAMazepine 200 MG tablet  Commonly known as: TEGretol   300 mg, Oral, 3 Times Daily      escitalopram 10 MG tablet  Commonly known as: LEXAPRO   10 mg, Oral, Daily               Discharge Diet:     Activity at Discharge:     Follow-up Appointments  No future appointments.  Additional Instructions for the Follow-ups that You Need to Schedule       Discharge Follow-up with PCP   As directed       Currently Documented PCP:    Provider, No Known    PCP Phone Number:    None     Follow Up Details: 5 to 7 days                Test Results Pending at Discharge  Pending Results       None             Risk for Readmission (LACE) Score: 3 (12/27/2024  6:00 AM)      Greater than 30 minutes spent in discharge activities for this patient    Signature:Electronically signed by Steven Bell PA-C, 12/27/24, 11:21 AM EST.

## 2024-12-27 NOTE — PLAN OF CARE
Goal Outcome Evaluation:     Problem: Adult Inpatient Plan of Care  Goal: Plan of Care Review  Outcome: Progressing  Goal: Patient-Specific Goal (Individualized)  Outcome: Progressing  Goal: Absence of Hospital-Acquired Illness or Injury  Outcome: Progressing  Intervention: Identify and Manage Fall Risk  Recent Flowsheet Documentation  Taken 12/27/2024 0400 by Melly Grijalva RN  Safety Promotion/Fall Prevention:   safety round/check completed   room organization consistent   clutter free environment maintained  Taken 12/27/2024 0200 by Melly Grijalva RN  Safety Promotion/Fall Prevention:   safety round/check completed   room organization consistent   clutter free environment maintained  Taken 12/27/2024 0000 by Melly Grijalva RN  Safety Promotion/Fall Prevention:   safety round/check completed   room organization consistent   clutter free environment maintained  Intervention: Prevent Skin Injury  Recent Flowsheet Documentation  Taken 12/27/2024 0400 by Melly Grijalva RN  Body Position: position changed independently  Taken 12/27/2024 0000 by Melly Grijalva RN  Body Position: position changed independently  Intervention: Prevent Infection  Recent Flowsheet Documentation  Taken 12/27/2024 0400 by Melly Grijalva RN  Infection Prevention:   hand hygiene promoted   personal protective equipment utilized   rest/sleep promoted  Taken 12/27/2024 0200 by Melly Grijalva RN  Infection Prevention:   hand hygiene promoted   personal protective equipment utilized   rest/sleep promoted  Taken 12/27/2024 0000 by Melly Grijalva RN  Infection Prevention:   hand hygiene promoted   personal protective equipment utilized   rest/sleep promoted  Goal: Optimal Comfort and Wellbeing  Outcome: Progressing  Goal: Readiness for Transition of Care  Outcome: Progressing

## 2025-02-03 ENCOUNTER — OFFICE VISIT (OUTPATIENT)
Dept: FAMILY MEDICINE CLINIC | Facility: CLINIC | Age: 55
End: 2025-02-03
Payer: MEDICAID

## 2025-02-03 ENCOUNTER — LAB (OUTPATIENT)
Dept: FAMILY MEDICINE CLINIC | Facility: CLINIC | Age: 55
End: 2025-02-03
Payer: MEDICAID

## 2025-02-03 VITALS
WEIGHT: 204.8 LBS | HEIGHT: 75 IN | OXYGEN SATURATION: 97 % | BODY MASS INDEX: 25.47 KG/M2 | HEART RATE: 65 BPM | DIASTOLIC BLOOD PRESSURE: 70 MMHG | TEMPERATURE: 98.1 F | SYSTOLIC BLOOD PRESSURE: 115 MMHG

## 2025-02-03 DIAGNOSIS — M48.00 SPINAL STENOSIS, MULTILEVEL: ICD-10-CM

## 2025-02-03 DIAGNOSIS — R21 RASH OF FACE: ICD-10-CM

## 2025-02-03 DIAGNOSIS — R90.89 ABNORMAL FINDING ON MRI OF BRAIN: ICD-10-CM

## 2025-02-03 DIAGNOSIS — L71.9 ROSACEA: ICD-10-CM

## 2025-02-03 DIAGNOSIS — R56.1 SEIZURE AFTER HEAD INJURY: Primary | ICD-10-CM

## 2025-02-03 DIAGNOSIS — E88.09 HYPOALBUMINEMIA: ICD-10-CM

## 2025-02-03 DIAGNOSIS — Z00.00 ANNUAL PHYSICAL EXAM: ICD-10-CM

## 2025-02-03 PROCEDURE — 80053 COMPREHEN METABOLIC PANEL: CPT | Performed by: INTERNAL MEDICINE

## 2025-02-03 PROCEDURE — 36415 COLL VENOUS BLD VENIPUNCTURE: CPT

## 2025-02-03 PROCEDURE — 99204 OFFICE O/P NEW MOD 45 MIN: CPT | Performed by: INTERNAL MEDICINE

## 2025-02-03 PROCEDURE — 1125F AMNT PAIN NOTED PAIN PRSNT: CPT | Performed by: INTERNAL MEDICINE

## 2025-02-03 RX ORDER — METRONIDAZOLE 7.5 MG/G
GEL TOPICAL 2 TIMES DAILY
Qty: 45 G | Refills: 2 | Status: SHIPPED | OUTPATIENT
Start: 2025-02-03 | End: 2025-05-04

## 2025-02-03 RX ORDER — BACLOFEN 10 MG/1
10 TABLET ORAL 3 TIMES DAILY
Qty: 45 TABLET | Refills: 0 | Status: SHIPPED | OUTPATIENT
Start: 2025-02-03 | End: 2025-02-18

## 2025-02-03 RX ORDER — CARBAMAZEPINE 200 MG/1
200 TABLET ORAL DAILY
Qty: 30 TABLET | Refills: 2 | Status: SHIPPED | OUTPATIENT
Start: 2025-02-03 | End: 2025-05-04
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
## 2025-02-04 LAB
ALBUMIN SERPL-MCNC: 4 G/DL (ref 3.5–5.2)
ALBUMIN/GLOB SERPL: 1.3 G/DL
ALP SERPL-CCNC: 109 U/L (ref 39–117)
ALT SERPL W P-5'-P-CCNC: 20 U/L (ref 1–41)
ANION GAP SERPL CALCULATED.3IONS-SCNC: 11.4 MMOL/L (ref 5–15)
AST SERPL-CCNC: 21 U/L (ref 1–40)
BILIRUB SERPL-MCNC: <0.2 MG/DL (ref 0–1.2)
BUN SERPL-MCNC: 8 MG/DL (ref 6–20)
BUN/CREAT SERPL: 7.8 (ref 7–25)
CALCIUM SPEC-SCNC: 9.6 MG/DL (ref 8.6–10.5)
CHLORIDE SERPL-SCNC: 102 MMOL/L (ref 98–107)
CO2 SERPL-SCNC: 28.6 MMOL/L (ref 22–29)
CREAT SERPL-MCNC: 1.02 MG/DL (ref 0.76–1.27)
EGFRCR SERPLBLD CKD-EPI 2021: 87.3 ML/MIN/1.73
GLOBULIN UR ELPH-MCNC: 3.2 GM/DL
GLUCOSE SERPL-MCNC: 91 MG/DL (ref 65–99)
POTASSIUM SERPL-SCNC: 4.1 MMOL/L (ref 3.5–5.2)
PROT SERPL-MCNC: 7.2 G/DL (ref 6–8.5)
SODIUM SERPL-SCNC: 142 MMOL/L (ref 136–145)

## 2025-06-18 ENCOUNTER — TELEPHONE (OUTPATIENT)
Dept: FAMILY MEDICINE CLINIC | Facility: CLINIC | Age: 55
End: 2025-06-18
Payer: MEDICAID

## 2025-06-18 NOTE — TELEPHONE ENCOUNTER
Telephone numbers were no longer in service. Krupa Felix can you please send letter to patient asking for a current telephone number. We were calling to discuss Cologuard. Unable to reach patient.

## 2025-08-18 DIAGNOSIS — R56.1 SEIZURE AFTER HEAD INJURY: Primary | ICD-10-CM

## 2025-08-18 RX ORDER — CARBAMAZEPINE 200 MG/1
200 TABLET ORAL DAILY
Qty: 30 TABLET | Refills: 0 | Status: SHIPPED | OUTPATIENT
Start: 2025-08-18